# Patient Record
Sex: FEMALE | Race: WHITE
[De-identification: names, ages, dates, MRNs, and addresses within clinical notes are randomized per-mention and may not be internally consistent; named-entity substitution may affect disease eponyms.]

---

## 2020-08-24 ENCOUNTER — HOSPITAL ENCOUNTER (EMERGENCY)
Dept: HOSPITAL 75 - ER | Age: 40
LOS: 1 days | Discharge: LEFT BEFORE DISCHARGE | End: 2020-08-25
Payer: SELF-PAY

## 2020-08-24 VITALS — BODY MASS INDEX: 43.49 KG/M2 | WEIGHT: 261.03 LBS | HEIGHT: 65 IN

## 2020-08-24 DIAGNOSIS — E66.9: ICD-10-CM

## 2020-08-24 DIAGNOSIS — A41.9: Primary | ICD-10-CM

## 2020-08-24 DIAGNOSIS — F19.90: ICD-10-CM

## 2020-08-24 DIAGNOSIS — F17.210: ICD-10-CM

## 2020-08-24 DIAGNOSIS — L03.312: ICD-10-CM

## 2020-08-24 DIAGNOSIS — L03.114: ICD-10-CM

## 2020-08-24 LAB
%HYPO/RBC NFR BLD AUTO: (no result) %
ALBUMIN SERPL-MCNC: 3.8 GM/DL (ref 3.2–4.5)
ALP SERPL-CCNC: 122 U/L (ref 40–136)
ALT SERPL-CCNC: 26 U/L (ref 0–55)
ANISOCYTOSIS BLD QL SMEAR: SLIGHT
APTT BLD: 36 SEC (ref 24–35)
BASOPHILS # BLD AUTO: 0 10^3/UL (ref 0–0.1)
BASOPHILS NFR BLD AUTO: 0 % (ref 0–10)
BILIRUB SERPL-MCNC: 0.4 MG/DL (ref 0.1–1)
BUN/CREAT SERPL: 9
CALCIUM SERPL-MCNC: 10.2 MG/DL (ref 8.5–10.1)
CHLORIDE SERPL-SCNC: 97 MMOL/L (ref 98–107)
CO2 SERPL-SCNC: 24 MMOL/L (ref 21–32)
CREAT SERPL-MCNC: 0.82 MG/DL (ref 0.6–1.3)
EOSINOPHIL # BLD AUTO: 0.1 10^3/UL (ref 0–0.3)
EOSINOPHIL NFR BLD AUTO: 1 % (ref 0–10)
ERYTHROCYTE [DISTWIDTH] IN BLOOD BY AUTOMATED COUNT: 16.3 % (ref 10–14.5)
GFR SERPLBLD BASED ON 1.73 SQ M-ARVRAT: > 60 ML/MIN
GLUCOSE SERPL-MCNC: 109 MG/DL (ref 70–105)
HCT VFR BLD CALC: 35 % (ref 35–52)
HGB BLD-MCNC: 11.3 G/DL (ref 11.5–16)
INR PPP: 1.1 (ref 0.8–1.4)
LYMPHOCYTES # BLD AUTO: 2.1 X 10^3 (ref 1–4)
LYMPHOCYTES NFR BLD AUTO: 12 % (ref 12–44)
MANUAL DIFFERENTIAL PERFORMED BLD QL: YES
MCH RBC QN AUTO: 26 PG (ref 25–34)
MCHC RBC AUTO-ENTMCNC: 32 G/DL (ref 32–36)
MCV RBC AUTO: 80 FL (ref 80–99)
METAMYELOCYTES NFR BLD: 2 %
MONOCYTES # BLD AUTO: 2.9 X 10^3 (ref 0–1)
MONOCYTES NFR BLD AUTO: 16 % (ref 0–12)
MONOCYTES NFR BLD: 14 %
NEUTROPHILS # BLD AUTO: 12.8 X 10^3 (ref 1.8–7.8)
NEUTROPHILS NFR BLD AUTO: 71 % (ref 42–75)
NEUTS BAND NFR BLD MANUAL: 66 %
NEUTS BAND NFR BLD: 5 %
PLATELET # BLD: 493 10^3/UL (ref 130–400)
PMV BLD AUTO: 9.5 FL (ref 7.4–10.4)
POTASSIUM SERPL-SCNC: 3.9 MMOL/L (ref 3.6–5)
PROT SERPL-MCNC: 7.6 GM/DL (ref 6.4–8.2)
PROTHROMBIN TIME: 14.2 SEC (ref 12.2–14.7)
SODIUM SERPL-SCNC: 134 MMOL/L (ref 135–145)
VARIANT LYMPHS NFR BLD MANUAL: 13 %
WBC # BLD AUTO: 17.9 10^3/UL (ref 4.3–11)

## 2020-08-24 PROCEDURE — 71260 CT THORAX DX C+: CPT

## 2020-08-24 PROCEDURE — 80306 DRUG TEST PRSMV INSTRMNT: CPT

## 2020-08-24 PROCEDURE — 84145 PROCALCITONIN (PCT): CPT

## 2020-08-24 PROCEDURE — 83605 ASSAY OF LACTIC ACID: CPT

## 2020-08-24 PROCEDURE — 80320 DRUG SCREEN QUANTALCOHOLS: CPT

## 2020-08-24 PROCEDURE — 99284 EMERGENCY DEPT VISIT MOD MDM: CPT

## 2020-08-24 PROCEDURE — 93041 RHYTHM ECG TRACING: CPT

## 2020-08-24 PROCEDURE — 85610 PROTHROMBIN TIME: CPT

## 2020-08-24 PROCEDURE — 85730 THROMBOPLASTIN TIME PARTIAL: CPT

## 2020-08-24 PROCEDURE — 84703 CHORIONIC GONADOTROPIN ASSAY: CPT

## 2020-08-24 PROCEDURE — 80053 COMPREHEN METABOLIC PANEL: CPT

## 2020-08-24 PROCEDURE — 87040 BLOOD CULTURE FOR BACTERIA: CPT

## 2020-08-24 PROCEDURE — 85007 BL SMEAR W/DIFF WBC COUNT: CPT

## 2020-08-24 PROCEDURE — 85027 COMPLETE CBC AUTOMATED: CPT

## 2020-08-24 PROCEDURE — 81000 URINALYSIS NONAUTO W/SCOPE: CPT

## 2020-08-24 PROCEDURE — 36415 COLL VENOUS BLD VENIPUNCTURE: CPT

## 2020-08-25 ENCOUNTER — HOSPITAL ENCOUNTER (OUTPATIENT)
Dept: HOSPITAL 75 - ER | Age: 40
Setting detail: OBSERVATION
LOS: 1 days | Discharge: LEFT BEFORE DISCHARGE | End: 2020-08-26
Attending: INTERNAL MEDICINE | Admitting: INTERNAL MEDICINE
Payer: SELF-PAY

## 2020-08-25 VITALS — SYSTOLIC BLOOD PRESSURE: 105 MMHG | DIASTOLIC BLOOD PRESSURE: 59 MMHG

## 2020-08-25 VITALS — BODY MASS INDEX: 43.56 KG/M2 | HEIGHT: 64.96 IN | WEIGHT: 261.47 LBS

## 2020-08-25 VITALS — SYSTOLIC BLOOD PRESSURE: 133 MMHG | DIASTOLIC BLOOD PRESSURE: 78 MMHG

## 2020-08-25 DIAGNOSIS — F29: ICD-10-CM

## 2020-08-25 DIAGNOSIS — F17.210: ICD-10-CM

## 2020-08-25 DIAGNOSIS — A41.9: Primary | ICD-10-CM

## 2020-08-25 DIAGNOSIS — Z79.899: ICD-10-CM

## 2020-08-25 DIAGNOSIS — F41.9: ICD-10-CM

## 2020-08-25 DIAGNOSIS — L03.90: ICD-10-CM

## 2020-08-25 DIAGNOSIS — I10: ICD-10-CM

## 2020-08-25 DIAGNOSIS — E78.00: ICD-10-CM

## 2020-08-25 DIAGNOSIS — F32.9: ICD-10-CM

## 2020-08-25 LAB
%HYPO/RBC NFR BLD AUTO: SLIGHT %
ALBUMIN SERPL-MCNC: 3.4 GM/DL (ref 3.2–4.5)
ALP SERPL-CCNC: 114 U/L (ref 40–136)
ALT SERPL-CCNC: 27 U/L (ref 0–55)
ANISOCYTOSIS BLD QL SMEAR: SLIGHT
APTT PPP: YELLOW S
BACTERIA #/AREA URNS HPF: NEGATIVE /HPF
BARBITURATES UR QL: NEGATIVE
BASOPHILS # BLD AUTO: 0 10^3/UL (ref 0–0.1)
BASOPHILS NFR BLD AUTO: 0 % (ref 0–10)
BASOPHILS NFR BLD MANUAL: 0 %
BENZODIAZ UR QL SCN: NEGATIVE
BILIRUB SERPL-MCNC: 0.3 MG/DL (ref 0.1–1)
BILIRUB UR QL STRIP: NEGATIVE
BUN/CREAT SERPL: 6
CALCIUM SERPL-MCNC: 9.3 MG/DL (ref 8.5–10.1)
CHLORIDE SERPL-SCNC: 97 MMOL/L (ref 98–107)
CO2 SERPL-SCNC: 27 MMOL/L (ref 21–32)
COCAINE UR QL: NEGATIVE
CREAT SERPL-MCNC: 0.79 MG/DL (ref 0.6–1.3)
EOSINOPHIL # BLD AUTO: 0.3 10^3/UL (ref 0–0.3)
EOSINOPHIL NFR BLD AUTO: 1 % (ref 0–10)
EOSINOPHIL NFR BLD MANUAL: 0 %
ERYTHROCYTE [DISTWIDTH] IN BLOOD BY AUTOMATED COUNT: 15.9 % (ref 10–14.5)
FIBRINOGEN PPP-MCNC: (no result) MG/DL
GFR SERPLBLD BASED ON 1.73 SQ M-ARVRAT: > 60 ML/MIN
GLUCOSE SERPL-MCNC: 118 MG/DL (ref 70–105)
GLUCOSE UR STRIP-MCNC: NEGATIVE MG/DL
HCT VFR BLD CALC: 31 % (ref 35–52)
HGB BLD-MCNC: 10 G/DL (ref 11.5–16)
KETONES UR QL STRIP: NEGATIVE
LEUKOCYTE ESTERASE UR QL STRIP: NEGATIVE
LYMPHOCYTES # BLD AUTO: 2.3 X 10^3 (ref 1–4)
LYMPHOCYTES NFR BLD AUTO: 11 % (ref 12–44)
MANUAL DIFFERENTIAL PERFORMED BLD QL: YES
MCH RBC QN AUTO: 26 PG (ref 25–34)
MCHC RBC AUTO-ENTMCNC: 33 G/DL (ref 32–36)
MCV RBC AUTO: 80 FL (ref 80–99)
METHADONE UR QL SCN: NEGATIVE
METHAMPHETAMINE SCREEN URINE S: POSITIVE
MONOCYTES # BLD AUTO: 2.8 X 10^3 (ref 0–1)
MONOCYTES NFR BLD AUTO: 13 % (ref 0–12)
MONOCYTES NFR BLD: 12 %
NEUTROPHILS # BLD AUTO: 15.8 X 10^3 (ref 1.8–7.8)
NEUTROPHILS NFR BLD AUTO: 74 % (ref 42–75)
NEUTS BAND NFR BLD MANUAL: 61 %
NEUTS BAND NFR BLD: 11 %
NITRITE UR QL STRIP: NEGATIVE
OPIATES UR QL SCN: NEGATIVE
OXYCODONE UR QL: NEGATIVE
PH UR STRIP: 6 [PH] (ref 5–9)
PLATELET # BLD: 478 10^3/UL (ref 130–400)
PMV BLD AUTO: 9.6 FL (ref 7.4–10.4)
POLYCHROMASIA BLD QL SMEAR: SLIGHT
POTASSIUM SERPL-SCNC: 3.2 MMOL/L (ref 3.6–5)
PROPOXYPH UR QL: NEGATIVE
PROT SERPL-MCNC: 6.8 GM/DL (ref 6.4–8.2)
PROT UR QL STRIP: (no result)
RBC #/AREA URNS HPF: (no result) /HPF
ROULEAUX BLD QL SMEAR: SLIGHT
SODIUM SERPL-SCNC: 133 MMOL/L (ref 135–145)
SP GR UR STRIP: 1.01 (ref 1.02–1.02)
SQUAMOUS #/AREA URNS HPF: (no result) /HPF
TARGETS BLD QL SMEAR: SLIGHT
TRICYCLICS UR QL SCN: NEGATIVE
VARIANT LYMPHS NFR BLD MANUAL: 16 %
WBC # BLD AUTO: 21.2 10^3/UL (ref 4.3–11)
WBC #/AREA URNS HPF: (no result) /HPF

## 2020-08-25 PROCEDURE — 83605 ASSAY OF LACTIC ACID: CPT

## 2020-08-25 PROCEDURE — 85007 BL SMEAR W/DIFF WBC COUNT: CPT

## 2020-08-25 PROCEDURE — 36415 COLL VENOUS BLD VENIPUNCTURE: CPT

## 2020-08-25 PROCEDURE — 85027 COMPLETE CBC AUTOMATED: CPT

## 2020-08-25 PROCEDURE — 80053 COMPREHEN METABOLIC PANEL: CPT

## 2020-08-25 PROCEDURE — 87040 BLOOD CULTURE FOR BACTERIA: CPT

## 2020-08-25 PROCEDURE — 99284 EMERGENCY DEPT VISIT MOD MDM: CPT

## 2020-08-25 PROCEDURE — 85025 COMPLETE CBC W/AUTO DIFF WBC: CPT

## 2020-08-25 RX ADMIN — SODIUM CHLORIDE SCH MLS/HR: 900 INJECTION, SOLUTION INTRAVENOUS at 01:24

## 2020-08-25 RX ADMIN — SODIUM CHLORIDE SCH MLS/HR: 900 INJECTION, SOLUTION INTRAVENOUS at 22:17

## 2020-08-25 RX ADMIN — SODIUM CHLORIDE ONE MLS/HR: 900 INJECTION, SOLUTION INTRAVENOUS at 23:37

## 2020-08-25 RX ADMIN — VANCOMYCIN HYDROCHLORIDE SCH MLS/HR: 500 INJECTION, POWDER, LYOPHILIZED, FOR SOLUTION INTRAVENOUS at 23:37

## 2020-08-25 RX ADMIN — SODIUM CHLORIDE ONE MLS/HR: 900 INJECTION, SOLUTION INTRAVENOUS at 23:30

## 2020-08-25 RX ADMIN — SODIUM CHLORIDE SCH MLS/HR: 900 INJECTION, SOLUTION INTRAVENOUS at 00:17

## 2020-08-25 RX ADMIN — SODIUM CHLORIDE ONE MLS/HR: 900 INJECTION, SOLUTION INTRAVENOUS at 23:38

## 2020-08-25 RX ADMIN — SODIUM CHLORIDE SCH MLS/HR: 900 INJECTION, SOLUTION INTRAVENOUS at 20:55

## 2020-08-25 RX ADMIN — SODIUM CHLORIDE ONE MLS/HR: 900 INJECTION, SOLUTION INTRAVENOUS at 23:31

## 2020-08-25 NOTE — DIAGNOSTIC IMAGING REPORT
PROCEDURE: CT chest with contrast only.



TECHNIQUE: Multiple contiguous axial images were obtained through

the chest after administration of intravenous contrast. Auto

Exposure Controls were utilized during the CT exam to meet ALARA

standards for radiation dose reduction. 



INDICATION:  Abscess left upper back.



CORRELATION: None



FINDINGS: 

A few scattered nonpathologically enlarged mediastinal lymph

nodes are present. There is prominent bilateral axillary lymph

nodes left greater than right. Given changes of the left back, on

the left likely reactive.  Heart size normal with trace anterior

pericardial effusion. 



The lung fields are clear of infiltrate.   No significant pleural

effusion.  



Mild hepatic steatosis. Nodularity and prominence left adrenal

gland, nonspecific.



There is rather extensive inflammatory density incompletely

visualized over the subcutaneous left periscapular subcutaneous

soft tissues. A definitive encapsulated drainable abscess does

not appear to be suggested. No abnormal gas collection.





IMPRESSION: 

Rather extensive subcutaneous inflammatory changes about the left

periscapular subcutaneous soft tissues. A definitive encapsulated

drainable abscess is not demonstrated. Likely reactive left

axillary lymphadenopathy.





A preliminary report was provided by [facility].







Dictated by: 



  Dictated on workstation # [id number]

## 2020-08-25 NOTE — ED INTEGUMENTARY GENERAL
General


Chief Complaint:  Skin/Wound Problems


Stated Complaint:  SPIDER BITE/FEVER 102.9


Nursing Triage Note:  


Pt ambulates to room #5 with c/o wound et fever. Pt reports approx x4days ago, 


while working in her yard, she felt a sharp "bite" to her L lateral shoulder. Pt




reports increase swelling, redness, warmth, et pain to wound bed. Initial pulse 


rate 143 with oral temp. 39.4. Pt noted to be tearful during triage. A&OX4.


Source:  patient





History of Present Illness


Date Seen by Provider:  Aug 24, 2020


Time Seen by Provider:  22:47


Initial Comments


PT ARRIVES VIA POV FROM HOME


C/O INFECTION IN LEFT SHOULDER BLADE AREA--NOTICED "SHARP STABBING PAIN" 4 DAYS 

AGO WHILE SHE WAS WORKING IN HER YARD


C/O INCREASED PAIN--RATES "11"/10


C/O INCREASED REDNESS AND SWELLING


C/O FEVER FOR THE LAST 2 DAYS--TEMP .9 YESTERDAY--HAD TYLENOL 6 HOURS 

AGO. 


NO DRAINAGE TO THE AREA





NO HISTORY OF SIMILAR, BUT STATES SHE "ALWAYS GETS PIMPLES" AROUND HER 

MIDSECTION





HAS NEW, LARGE TATTOO IN THIS AREA 2 WEEKS AGO





SYMPTOMS NO DIFFERENT TONIGHT


HAS NOT SOUGHT CARE UNTIL TONIGHT





PCP: NONE





Allergies and Home Medications


Allergies


Coded Allergies:  


     No Known Drug Allergies (Verified , 2/19/09)





Patient Home Medication List


Home Medication List Reviewed:  Yes





Review of Systems


Review of Systems


Constitutional:  see HPI, chills, fever


Respiratory:  no symptoms reported; No cough, No short of breath


Cardiovascular:  no symptoms reported


Gastrointestinal:  no symptoms reported


Genitourinary:  no symptoms reported


Musculoskeletal:  see HPI


Skin:  see HPI


Psychiatric/Neurological:  No Symptoms Reported


Endocrine:  No Symptoms Reported


Hematologic/Lymphatic:  No Symptoms Reported





Past Medical-Social-Family Hx


Patient Social History


Alcohol Use:  Past History (HISTORY OF ABUSE/HEAVY USE--CLAIMS NONE X 12 YEARS)


Recreational Drug Use:  Yes (SMOKES METHAMPHETAMINE AND THC)


Drug of Choice:  HX Methamphetmaine


Smoking Status:  Current Everyday Smoker (1 1/2 PPD)


Type Used:  Cigarettes


2nd Hand Smoke Exposure:  No


Recent Foreign Travel:  No


Contact w/Someone Who Travel:  No


Recent Infectious Disease Expo:  No


Recent Hopitalizations:  No





Seasonal Allergies


Seasonal Allergies:  No





Past Medical History


Surgeries:  No


Respiratory:  Yes (NO INHALERS OR NEBULIZERS OR O2)


COPD


Cardiac:  Yes (SUPPOSED TO BE ON MEDICATIONS BUT DOES NOT TAKE)


High Cholesterol, Hypertension


Neurological:  No


Reproductive Disorders:  No


Sexually Transmitted Disease:  No


Genitourinary:  No


Gastrointestinal:  No


Musculoskeletal:  No


Endocrine:  Yes (OBESITY)


HEENT:  No


Cancer:  No


Psychosocial:  Yes (SUPPOSED TO BE ON MEDS, BUT DOES NOT TAKE)


Anxiety, Depression


Integumentary:  Yes


Recent Skin Changes


Blood Disorders:  No





Family Medical History





SOCIAL HISTORY: 


-HISTORY OF ETOH ABUSE/HEAVY USE--CLAIMS NONE X 12 YEARS, PER PT ON


08/24/20


-SMOKES METHAMPHETAMINES AND THC--DENIES IV USE


-SMOKES CIGARETTES 1 1/2 PPD





Physical Exam


Vital Signs





Vital Signs - First Documented








 8/24/20





 22:48


 


Temp 39.4


 


Pulse 143


 


Resp 25


 


B/P (MAP) 153/88 (109)


 


Pulse Ox 98


 


O2 Delivery Room Air





Capillary Refill : Less Than 3 Seconds


General Appearance:  obese, other (VERY DRAMATIC--"SOBBING" BUT NO TEARS. )


Cardiovascular:  no edema, no JVD, no murmur, tachycardia (140'S)


Respiratory:  normal breath sounds, no respiratory distress, no accessory muscle

use


Gastrointestinal:  non tender, soft


Back:  other (LEFT SCAPULAR AREA WITH RECENT, LARGE TATTOO; PT HAS A VERY LARGE 

AREA OF CELLULITIS--28 X 13 CM AREA OF ERYTHEMA, SWELLING, INDURATION AND MARKED

TENDERNESS. NO FLUCTUANCE. NO DRAINAGE. NO POINTING. NO OBVIOUS WOUND, ETC. NO 

STREAKS. )


Neurologic/Psychiatric:  CNs II-XII nml as tested, no motor/sensory deficits, 

alert, oriented x 3


Skin:  normal color, warm/dry, other (AS ABOVE)





Progress/Results/Core Measures


Results/Orders


Lab Results





Laboratory Tests








Test


 8/24/20


22:50 8/25/20


00:40 Range/Units


 


 


White Blood Count


 17.9 H


 


 4.3-11.0


10^3/uL


 


Red Blood Count


 4.37 


 


 4.35-5.85


10^6/uL


 


Hemoglobin 11.3 L  11.5-16.0  G/DL


 


Hematocrit 35   35-52  %


 


Mean Corpuscular Volume 80   80-99  FL


 


Mean Corpuscular Hemoglobin 26   25-34  PG


 


Mean Corpuscular Hemoglobin


Concent 32 


 


 32-36  G/DL





 


Red Cell Distribution Width 16.3 H  10.0-14.5  %


 


Platelet Count


 493 H


 


 130-400


10^3/uL


 


Mean Platelet Volume 9.5   7.4-10.4  FL


 


Neutrophils (%) (Auto) 71   42-75  %


 


Lymphocytes (%) (Auto) 12   12-44  %


 


Monocytes (%) (Auto) 16 H  0-12  %


 


Eosinophils (%) (Auto) 1   0-10  %


 


Basophils (%) (Auto) 0   0-10  %


 


Neutrophils # (Auto) 12.8 H  1.8-7.8  X 10^3


 


Lymphocytes # (Auto) 2.1   1.0-4.0  X 10^3


 


Monocytes # (Auto) 2.9 H  0.0-1.0  X 10^3


 


Eosinophils # (Auto)


 0.1 


 


 0.0-0.3


10^3/uL


 


Basophils # (Auto)


 0.0 


 


 0.0-0.1


10^3/uL


 


Neutrophils % (Manual) 66    %


 


Lymphocytes % (Manual) 13    %


 


Monocytes % (Manual) 14    %


 


Metamyelocytes % 2    %


 


Band Neutrophils 5    %


 


Hypochromasia MODERATE    


 


Anisocytosis SLIGHT    


 


Prothrombin Time 14.2   12.2-14.7  SEC


 


INR Comment 1.1   0.8-1.4  


 


Activated Partial


Thromboplast Time 36 H


 


 24-35  SEC





 


Sodium Level 134 L  135-145  MMOL/L


 


Potassium Level 3.9   3.6-5.0  MMOL/L


 


Chloride Level 97 L    MMOL/L


 


Carbon Dioxide Level 24   21-32  MMOL/L


 


Anion Gap 13   5-14  MMOL/L


 


Blood Urea Nitrogen 7   7-18  MG/DL


 


Creatinine


 0.82 


 


 0.60-1.30


MG/DL


 


Estimat Glomerular Filtration


Rate > 60 


 


  





 


BUN/Creatinine Ratio 9    


 


Glucose Level 109 H    MG/DL


 


Lactic Acid Level


 2.26 *H


 


 0.50-2.00


MMOL/L


 


Calcium Level 10.2 H  8.5-10.1  MG/DL


 


Corrected Calcium 10.4 H  8.5-10.1  MG/DL


 


Total Bilirubin 0.4   0.1-1.0  MG/DL


 


Aspartate Amino Transf


(AST/SGOT) 28 


 


 5-34  U/L





 


Alanine Aminotransferase


(ALT/SGPT) 26 


 


 0-55  U/L





 


Alkaline Phosphatase 122     U/L


 


Total Protein 7.6   6.4-8.2  GM/DL


 


Albumin 3.8   3.2-4.5  GM/DL


 


Procalcitonin 0.64 H  <0.10  NG/ML


 


Serum Pregnancy Test,


Qualitative NEGATIVE 


 


 NEGATIVE  





 


Serum Alcohol < 10   <10  MG/DL


 


Urine Color  YELLOW   


 


Urine Clarity  SL CLOUDY   


 


Urine pH  6.0  5-9  


 


Urine Specific Gravity  1.010 L 1.016-1.022  


 


Urine Protein  1+ H NEGATIVE  


 


Urine Glucose (UA)  NEGATIVE  NEGATIVE  


 


Urine Ketones  NEGATIVE  NEGATIVE  


 


Urine Nitrite  NEGATIVE  NEGATIVE  


 


Urine Bilirubin  NEGATIVE  NEGATIVE  


 


Urine Urobilinogen  1.0  < = 1.0  MG/DL


 


Urine Leukocyte Esterase  NEGATIVE  NEGATIVE  


 


Urine RBC (Auto)  TRACE-I  NEGATIVE  


 


Urine RBC  NONE   /HPF


 


Urine WBC  NONE   /HPF


 


Urine Squamous Epithelial


Cells 


 25-50 H


  /HPF





 


Urine Crystals  NONE   /LPF


 


Urine Bacteria  NEGATIVE   /HPF


 


Urine Casts  NONE   /LPF


 


Urine Mucus  NEGATIVE   /LPF


 


Urine Culture Indicated  NO   


 


Urine Opiates Screen  NEGATIVE  NEGATIVE  


 


Urine Oxycodone Screen  NEGATIVE  NEGATIVE  


 


Urine Methadone Screen  NEGATIVE  NEGATIVE  


 


Urine Propoxyphene Screen  NEGATIVE  NEGATIVE  


 


Urine Barbiturates Screen  NEGATIVE  NEGATIVE  


 


Ur Tricyclic Antidepressants


Screen 


 NEGATIVE 


 NEGATIVE  





 


Urine Phencyclidine Screen  NEGATIVE  NEGATIVE  


 


Urine Amphetamines Screen  POSITIVE H NEGATIVE  


 


Urine Methamphetamines Screen  POSITIVE H NEGATIVE  


 


Urine Benzodiazepines Screen  NEGATIVE  NEGATIVE  


 


Urine Cocaine Screen  NEGATIVE  NEGATIVE  


 


Urine Cannabinoids Screen  NEGATIVE  NEGATIVE  








My Orders





Orders - PATEL COVARRUBIAS DO


Ed Iv/Invasive Line Start (8/24/20 22:53)


Monitor-Rhythm Ecg Trace Only (8/24/20 22:53)


Alcohol (8/24/20 22:53)


Cbc With Automated Diff (8/24/20 22:53)


Comprehensive Metabolic Panel (8/24/20 22:53)


Drug Screen Stat (Urine) (8/24/20 22:53)


Hcg,Qualitative Serum (8/24/20 22:53)


Lactic Acid Analyzer (8/24/20 22:53)


Protime With Inr (8/24/20 22:53)


Partial Thromboplastin Time (8/24/20 22:53)


Ua Culture If Indicated (8/24/20 22:53)


Blood Culture (8/24/20 22:53)


Ed Iv/Invasive Line Start (8/24/20 22:53)


Lactated Ringers (Lr 1000 Ml Iv Solution (8/24/20 22:53)


Ketorolac Injection (Toradol Injection) (8/24/20 22:53)


Ct Chest W (8/24/20 22:53)


Piperacillin Sodium/Tazobactam (Zosyn Vi (8/24/20 23:15)


Vancomycin Injection (Vancomycin Injecti (8/24/20 23:15)


Procalcitonin (Pct) (8/24/20 22:50)


Manual Differential (8/24/20 22:50)


Acetaminophen  Tablet (Tylenol  Tablet) (8/25/20 00:00)


Ibuprofen  Tablet (Motrin  Tablet) (8/25/20 00:00)


Dipht,Pertuss(Acell),Tet Adult (Boostrix (8/25/20 00:45)





Medications Given in ED





Current Medications








 Medications  Dose


 Ordered  Sig/Lucila


 Route  Start Time


 Stop Time Status Last Admin


Dose Admin


 


 Acetaminophen  1,000 mg  ONCE  ONCE


 PO  8/25/20 00:00


 8/25/20 00:01 DC 8/25/20 00:17


1,000 MG


 


 Ibuprofen  800 mg  ONCE  ONCE


 PO  8/25/20 00:00


 8/25/20 00:01 DC 8/25/20 00:17


800 MG


 


 Lactated Ringer's  1,000 ml @ 


 0 mls/hr  Q0M ONCE


 IV  8/24/20 22:53


 8/24/20 22:58 DC 8/24/20 23:11


0 MLS/HR


 


 Piperacillin Sod/


 Tazobactam Sod


 4.5 gm/Sodium


 Chloride  100 ml @ 


 200 mls/hr  ONCE  ONCE


 IV  8/24/20 23:15


 8/24/20 23:44 DC 8/24/20 23:16


200 MLS/HR








Vital Signs/I&O











 8/24/20 8/25/20





 22:48 01:35


 


Temp 39.4 36.9


 


Pulse 143 99


 


Resp 25 18


 


B/P (MAP) 153/88 (109) 105/59


 


Pulse Ox 98 97


 


O2 Delivery Room Air Room Air














Blood Pressure Mean:                    109











Progress


Progress Note :  


Progress Note


PT STATES ON ARRIVAL SHE IS NOT STAYING IN THE HOSPITAL--STATES SHE IS "CAREGIVE

R FOR HER MOT[place]" AND THEREFORE CANNOT STAY


MYSELF AND RN BOTH ADAMANTLY ENCOURAGED THE PATIENT MULTIPLE TIMES, TO STAY AND 

BE ADMITTED TO THE HOSPITAL, AND SHE ADAMANTLY REFUSES


PT ADVISED OF RISKS OF WORSENING OF INFECTION/SHOCK/DEATH AND OF BENEFITS OF 

STAYING


ADVISED HER TO FOLLOW UP WITH  OF CHOICE IN THE MORNING FOR FURTHER CARE





PT FEELING BETTER, HEART RATE IN 90'S, BP >100, TEMP DOWN PRIOR TO PT SIGNING 

OUT AMA





Diagnostic Imaging





Comments


CT CHEST INCLUDING SOFT TISSUES--MODERATE SUB Q INFLAMMATION AND NON-

ENCAPSULATED SUB Q FLUID IN LEFT PERISCAPULAR SUB Q SOFT TISSUES . NO EVIDENCE 

OF ENCAPSULATED ABSCESS, MILD SHODDY LEFT AXILLARY ADENOPATHY. PER [facility] VIA 

FAX AT 0018


   Reviewed:  Reviewed by Me





Departure


Impression





   Primary Impression:  


   Sepsis


   Additional Impressions:  


   CELLULITIS OF BACK AND LEFT SCAPULAR AREA


   Left against medical advice


   Illicit drug use


Disposition:  07 AGAINST MEDICAL ADVICE


Condition:  Against Medical Advice





Departure-Patient Inst.


Referrals:  


NO,LOCAL PHYSICIAN (PCP/Family)


Primary Care Physician





Images


Torso/Trunk











1 - Cellulitis, Swelling, Tenderness














PATEL COVARRUBIAS DO                 Aug 25, 2020 00:34

## 2020-08-25 NOTE — NUR
JER RODRÍGUEZ admitted to room 423-1, with an admitting diagnosis of CELLULITIS, on 
08/25/20 from ED via WHEELCHAIR, accompanied by ED STAFF. JER RODRÍGUEZ introduced to 
surroundings, call light, bed controls, phone, TV, temperature control, lights, meal times, 
smoking policy, visitor policy, side rail policy, bathrooms and showers.  Patient Rights 
given to patient in the handbook. JER RODRÍGUEZ verbalizes understanding that [facility] is not responsible for the loss or damage to any personal effects or valuables that 
are kept in the patients posession during their hospitalization. JER RODRÍGUEZ 
verbalizes understanding of Interdisciplinary Patient Education. Patient and/or family were 
informed about the Rapid Response Team and its purpose.

## 2020-08-25 NOTE — NUR
JER RODRÍGUEZ admitted to room 423-1, with an admitting diagnosis of cellulitis of 
left upper arm/shoulder, on 08/25/20 from ER via wheelchair, accompanied by 
staff.JER RODRÍGUEZ introduced to surroundings, call light, bed controls, phone, TV, 
temperature control, lights, meal times, smoking policy, visitor policy, side rail policy, 
bathrooms and showers.  Patient Rights given to patient in the handbook. JER RODRÍGUEZ 
verbalizes understanding that [facility] is not responsible for the loss or damage to any 
personal effects or valuables that are kept in the patients posession during their 
hospitalization.

## 2020-08-25 NOTE — ED INTEGUMENTARY GENERAL
General


Chief Complaint:  Skin/Wound Problems


Stated Complaint:  FEVER / SORE ON BACK


Nursing Triage Note:  


left shoulder redness/warmth/pain. reports insect bite to shoulder 4-5 days ago.


Source:  patient


Exam Limitations:  no limitations





History of Present Illness


Date Seen by Provider:  Aug 25, 2020


Time Seen by Provider:  19:25


Initial Comments


39-year-old female who presents to emergency room with complaints of increased 

pain, fever, worsening cellulitis to the left shoulder. She reports that she 

received an insect bite to the left shoulder 4-5 days ago. She was seen and 

evaluated and found to have sepsis from the cellulitis last night but left 

AGAINST MEDICAL ADVICE because she did not want to be admitted to the hospital. 

She agrees to be admitted to the hospital at this time.


Associated Symptoms:  change in skin texture, fever





Allergies and Home Medications


Allergies


Coded Allergies:  


     No Known Drug Allergies (Verified , 2/19/09)





Home Medications


No Active Prescriptions or Reported Meds





Patient Home Medication List


Home Medication List Reviewed:  Yes





Review of Systems


Review of Systems


Constitutional:  see HPI; No chills; fever


Skin:  see HPI, change in color, other (cellulitis to left shoulder)





All Other Systems Reviewed


Negative Unless Noted:  Yes





Past Medical-Social-Family Hx


Past Med/Social Hx:  Reviewed Nursing Past Med/Soc Hx


Patient Social History


Alcohol Use:  Denies Use


Recreational Drug Use:  No


Drug of Choice:  denies


Smoking Status:  Current Everyday Smoker


Type Used:  Cigarettes


2nd Hand Smoke Exposure:  No


Recent Foreign Travel:  No


Contact w/Someone Who Travel:  No


Recent Infectious Disease Expo:  No


Recent Hopitalizations:  No


Physical Abuse:  No


Sexual Abuse:  No


Mistreated:  No


Fear:  No





Immunizations Up To Date


Tetanus Booster (TDap):  Unknown





Seasonal Allergies


Seasonal Allergies:  No





Past Medical History


Surgeries:  No


Respiratory:  Yes


COPD


Cardiac:  Yes


High Cholesterol, Hypertension


Neurological:  No


Pregnant:  No


Reproductive Disorders:  No


Sexually Transmitted Disease:  No


Genitourinary:  No


Gastrointestinal:  No


Musculoskeletal:  No


Endocrine:  No


HEENT:  No


Cancer:  No


Psychosocial:  Yes


Anxiety, Depression


Integumentary:  Yes


Recent Skin Changes


Blood Disorders:  No





Family Medical History


Reviewed Nursing Family Hx





SOCIAL HISTORY: 


-HISTORY OF ETOH ABUSE/HEAVY USE--CLAIMS NONE X 12 YEARS, PER PT ON


08/24/20


-SMOKES METHAMPHETAMINES AND THC--DENIES IV USE


-SMOKES CIGARETTES 1 1/2 PPD





Physical Exam


Vital Signs





Vital Signs - First Documented








 8/25/20





 19:18


 


Temp 38.1


 


Pulse 120


 


Resp 18


 


B/P (MAP) 148/84 (105)


 


Pulse Ox 98


 


O2 Delivery Room Air





Capillary Refill : Less Than 3 Seconds


General Appearance:  WD/WN, no apparent distress


Back:  other (LEFT SCAPULAR AREA WITH RECENT, LARGE TATTOO; PT HAS A VERY LARGE 

AREA OF CELLULITIS--28 X 13 CM AREA OF ERYTHEMA, SWELLING, INDURATION AND MARKED

TENDERNESS. NO FLUCTUANCE. NO DRAINAGE. AREA GO GREY COLOR IN THE CENTER OF THE 

MARKED WOUND FROM YESTERDAY. WOUND HAS NOT SPREAD OUT OT THE WOUND MARKING.)


Neurologic/Psychiatric:  alert, normal mood/affect, oriented x 3


Skin:  normal color, warm/dry





Progress/Results/Core Measures


Results/Orders


Lab Results





Laboratory Tests








Test


 8/25/20


19:30 Range/Units


 


 


White Blood Count


 21.2 H


 4.3-11.0


10^3/uL


 


Red Blood Count


 3.83 L


 4.35-5.85


10^6/uL


 


Hemoglobin 10.0 L 11.5-16.0  G/DL


 


Hematocrit 31 L 35-52  %


 


Mean Corpuscular Volume 80  80-99  FL


 


Mean Corpuscular Hemoglobin 26  25-34  PG


 


Mean Corpuscular Hemoglobin


Concent 33 


 32-36  G/DL





 


Red Cell Distribution Width 15.9 H 10.0-14.5  %


 


Platelet Count


 478 H


 130-400


10^3/uL


 


Mean Platelet Volume 9.6  7.4-10.4  FL


 


Neutrophils (%) (Auto) 74  42-75  %


 


Lymphocytes (%) (Auto) 11 L 12-44  %


 


Monocytes (%) (Auto) 13 H 0-12  %


 


Eosinophils (%) (Auto) 1  0-10  %


 


Basophils (%) (Auto) 0  0-10  %


 


Neutrophils # (Auto) 15.8 H 1.8-7.8  X 10^3


 


Lymphocytes # (Auto) 2.3  1.0-4.0  X 10^3


 


Monocytes # (Auto) 2.8 H 0.0-1.0  X 10^3


 


Eosinophils # (Auto)


 0.3 


 0.0-0.3


10^3/uL


 


Basophils # (Auto)


 0.0 


 0.0-0.1


10^3/uL


 


Neutrophils % (Manual) 61   %


 


Lymphocytes % (Manual) 16   %


 


Monocytes % (Manual) 12   %


 


Eosinophils % (Manual) 0   %


 


Basophils % (Manual) 0   %


 


Band Neutrophils 11   %


 


Polychromasia SLIGHT   


 


Hypochromasia SLIGHT   


 


Anisocytosis SLIGHT   


 


Target Cells SLIGHT   


 


[place] SLIGHT   


 


Sodium Level 133 L 135-145  MMOL/L


 


Potassium Level 3.2 L 3.6-5.0  MMOL/L


 


Chloride Level 97 L   MMOL/L


 


Carbon Dioxide Level 27  21-32  MMOL/L


 


Anion Gap 9  5-14  MMOL/L


 


Blood Urea Nitrogen 5 L 7-18  MG/DL


 


Creatinine


 0.79 


 0.60-1.30


MG/DL


 


Estimat Glomerular Filtration


Rate > 60 


  





 


BUN/Creatinine Ratio 6   


 


Glucose Level 118 H   MG/DL


 


Lactic Acid Level


 1.57 


 0.50-2.00


MMOL/L


 


Calcium Level 9.3  8.5-10.1  MG/DL


 


Corrected Calcium 9.8  8.5-10.1  MG/DL


 


Total Bilirubin 0.3  0.1-1.0  MG/DL


 


Aspartate Amino Transf


(AST/SGOT) 24 


 5-34  U/L





 


Alanine Aminotransferase


(ALT/SGPT) 27 


 0-55  U/L





 


Alkaline Phosphatase 114    U/L


 


Total Protein 6.8  6.4-8.2  GM/DL


 


Albumin 3.4  3.2-4.5  GM/DL








My Orders





Orders - RICK MONTOYA


Comprehensive Metabolic Panel (8/25/20 19:24)


Ed Iv/Invasive Line Start (8/25/20 19:24)


Cbc With Automated Diff (8/25/20 19:24)


Blood Culture (8/25/20 19:24)


Lactic Acid Analyzer (8/25/20 19:24)


Manual Differential (8/25/20 19:30)


Ns Iv 1000 Ml (Sodium Chloride 0.9%) (8/25/20 20:45)


Acetaminophen  Tablet (Tylenol  Tablet) (8/25/20 20:45)


Piperacillin/Tazobactam (Bulk) (Zosyn In (8/25/20 20:45)


Piperacillin Sodium/Tazobactam (Zosyn Vi (8/25/20 20:44)


Ns (Ivpb) (Sodium Chloride 0.9% Ivpb Bag (8/25/20 20:44)





Medications Given in ED





Current Medications








 Medications  Dose


 Ordered  Sig/Lucila


 Route  Start Time


 Stop Time Status Last Admin


Dose Admin


 


 Acetaminophen  1,000 mg  ONCE  ONCE


 PO  8/25/20 20:45


 8/25/20 20:46 DC 8/25/20 20:56


1,000 MG


 


 Piperacillin Sod/


 Tazobactam Sod


 4.5 gm/Sodium


 Chloride  120 ml @ 


 240 mls/hr  ONCE  ONCE


 IV  8/25/20 20:45


 8/25/20 21:14 DC 8/25/20 20:55


240 MLS/HR








Vital Signs/I&O











 8/25/20 8/25/20





 19:18 20:56


 


Temp 38.1 38.1


 


Pulse 120 


 


Resp 18 


 


B/P (MAP) 148/84 (105) 


 


Pulse Ox 98 


 


O2 Delivery Room Air 














Blood Pressure Mean:                    105











Progress


Progress Note :  


   Time:  20:40


Progress Note


I have seen and evaluated the patient. I've informed her of her laboratory 

findings. I did discuss the case with Dr. Orellana who is on-call for 

hospitalist services. She agrees to accept the patient to her services. 

Continuing vancomycin and Zosyn that was initiated last night.





Departure


Communication (Admissions)


Time/Spoke to Admitting Phy:  20:40


Dr. Orellana.





Impression





   Primary Impression:  


   Cellulitis


Disposition:  09 ADMITTED AS INPATIENT


Condition:  Stable/Unchanged





Admissions


Decision to Admit Reason:  Admit from ER (General)


Decision to Admit/Date:  Aug 25, 2020


Time/Decision to Admit Time:  20:40





Departure-Patient Inst.


Referrals:  


NO,LOCAL PHYSICIAN (PCP/Family)


Primary Care Physician


Scripts


No Active Prescriptions or Reported Meds











RICK MONTOYA                  Aug 25, 2020 21:45

## 2020-08-26 VITALS — SYSTOLIC BLOOD PRESSURE: 110 MMHG | DIASTOLIC BLOOD PRESSURE: 60 MMHG

## 2020-08-26 VITALS — DIASTOLIC BLOOD PRESSURE: 71 MMHG | SYSTOLIC BLOOD PRESSURE: 128 MMHG

## 2020-08-26 VITALS — DIASTOLIC BLOOD PRESSURE: 67 MMHG | SYSTOLIC BLOOD PRESSURE: 126 MMHG

## 2020-08-26 LAB
ALBUMIN SERPL-MCNC: 3 GM/DL (ref 3.2–4.5)
ALP SERPL-CCNC: 120 U/L (ref 40–136)
ALT SERPL-CCNC: 25 U/L (ref 0–55)
BASOPHILS # BLD AUTO: 0 10^3/UL (ref 0–0.1)
BASOPHILS NFR BLD AUTO: 0 % (ref 0–10)
BILIRUB SERPL-MCNC: 0.4 MG/DL (ref 0.1–1)
BUN/CREAT SERPL: 7
CALCIUM SERPL-MCNC: 8.5 MG/DL (ref 8.5–10.1)
CHLORIDE SERPL-SCNC: 102 MMOL/L (ref 98–107)
CO2 SERPL-SCNC: 23 MMOL/L (ref 21–32)
CREAT SERPL-MCNC: 0.67 MG/DL (ref 0.6–1.3)
EOSINOPHIL # BLD AUTO: 0.2 10^3/UL (ref 0–0.3)
EOSINOPHIL NFR BLD AUTO: 1 % (ref 0–10)
ERYTHROCYTE [DISTWIDTH] IN BLOOD BY AUTOMATED COUNT: 16.1 % (ref 10–14.5)
GFR SERPLBLD BASED ON 1.73 SQ M-ARVRAT: > 60 ML/MIN
GLUCOSE SERPL-MCNC: 105 MG/DL (ref 70–105)
HCT VFR BLD CALC: 29 % (ref 35–52)
HGB BLD-MCNC: 9.3 G/DL (ref 11.5–16)
LYMPHOCYTES # BLD AUTO: 2.1 X 10^3 (ref 1–4)
LYMPHOCYTES NFR BLD AUTO: 11 % (ref 12–44)
MANUAL DIFFERENTIAL PERFORMED BLD QL: NO
MCH RBC QN AUTO: 26 PG (ref 25–34)
MCHC RBC AUTO-ENTMCNC: 33 G/DL (ref 32–36)
MCV RBC AUTO: 80 FL (ref 80–99)
MONOCYTES # BLD AUTO: 2.6 X 10^3 (ref 0–1)
MONOCYTES NFR BLD AUTO: 14 % (ref 0–12)
NEUTROPHILS # BLD AUTO: 14.1 X 10^3 (ref 1.8–7.8)
NEUTROPHILS NFR BLD AUTO: 74 % (ref 42–75)
PLATELET # BLD: 424 10^3/UL (ref 130–400)
PMV BLD AUTO: 9.9 FL (ref 7.4–10.4)
POTASSIUM SERPL-SCNC: 3.2 MMOL/L (ref 3.6–5)
PROT SERPL-MCNC: 6.2 GM/DL (ref 6.4–8.2)
SODIUM SERPL-SCNC: 137 MMOL/L (ref 135–145)
WBC # BLD AUTO: 19 10^3/UL (ref 4.3–11)

## 2020-08-26 RX ADMIN — VANCOMYCIN HYDROCHLORIDE SCH MLS/HR: 500 INJECTION, POWDER, LYOPHILIZED, FOR SOLUTION INTRAVENOUS at 02:10

## 2020-08-26 NOTE — DISCHARGE INST-SIMPLE/STANDARD
Discharge Inst-Standard


Discharge Medications


New, Converted or Re-Newed RX:  Transmitted to Pharmacy





Patient Instructions/Follow Up


Plan of Care/Instructions/FU:  


Please continue to take your medications as written. Please follow up with


a primary care doctor to follow up this hospital stay.


Activity as Tolerated:  Yes


Discharge Diet:  No Restrictions


Return to The Hospital For:  


Fever, worsening redness, swelling, drainage, shortness of breath, chest


pain, if you feel you are getting worse.





Planned Outpatient Orders/Ref.


Pneu Vac Indicated:  Yes











TAMMY BOBO MD              Aug 26, 2020 10:19

## 2020-08-26 NOTE — NUR
RD ASSESSMENT 



PMHx: COPD; hypercholesterolemia; HTN



PT INTERACTION: Pt was awake and semi-pleasant during nutrition assessment. Pt states 
current appetite is good, "but the food your serve here is terrible and tastes bad." Note PO 
intake of 50% x1meal, per chart review. Pt states following a regular diet at home, and has 
no issues with chewing/swallowing food, despite missing teeth (per pt). Pt states no recent 
issues wtih nausea, vomiting, constipation, or diarrhea, and that her last BM was prior to 
admit. Note pt not currently on bowel regimen per chart review. Pt states some recent wt 
loss, but unsure of amount lost. Pt attributes wt loss to recent passing of . Note 
unable to determine recent wt hx, per chart review. Note presence of wound (shoulder), per 
chart review. 



ABNORMAL NUTRITION-RELATED LAB VALUES

LOW: K 3.2; BUN 5; Pro 5.2; alb 3.0 

HIGH:



Est. kcal needs: 1775 kcal | 15 kcal/kg  

Est. Pro needs:  119 g Pro | 1.0 g Pro/kg 



PES STATEMENT: Inadequate oral intake (NI-2.1) related to loss of appetite as evidenced by 
pt interview | PO intake 50% x1meal



Inadequate protein intake (NI-5.6.1) related to increased protein needs as evidenced by 
presence of wound (shoulder)



INTERVENTION:  

Continue with current diet order of Regular diet. 

Pt may benefit from nutrition supplementation if PO intake declines.

Encouraged pt to eat when able. 

Will continue to follow and reassess as pt needs, intake, and status change. 



MONITOR/EVALUATE:  

PO Intake; Plan of Care; Hydration Status; Weight Status; Lab Values 





ALLEN Benjamin, MS, RD, LD

## 2020-08-26 NOTE — NUR
SPOKE WITH THE PT TO COMPLETE THE MED REC



PT DENIES TAKING ANY PRESCRIPTION MEDICATIONS



PT SAYS SHE DOES TAKE TYLENOL X-STRENGTH (500MG) AND WILL TAKE 4 TABS FOR EACH DOSE AND DOES 
THAT A COUPLE TIMES A DAY

## 2020-08-26 NOTE — NUR
CM/SS:  Visited with pt as to plan for discharge and that pt is requesting to leave AMA 
Against Medical Advise as she needs to care for her mother in law and has her celebration of 
life for her spouse today at 1pm.



Plan:  Pt to leave AMA Against Medical Advice  to get home and care for her Mother in law, 
and have the celebration of life for her spouse this afternoon at 1pm.



Pt is tearful and shares the story of her  and his illness and his death at [facility].  Pt is provided emotional support.  Today is also her spouse's birthday and that 
is why they have decided to have the celebration of life today.  



Pt is educated on picking up medication and taking it and feeling better.  Pt is educated on 
Leaving Against Medical Advice  and that normally we like pt's to leave with the ok from the 
physician.  Pt verbalizes understanding, and shares that she HAS to go, and she cannot stay. 
 Pt is encouraged to take her medication and  get to feeling better.

## 2020-08-26 NOTE — SHORT STAY SUMMARY-HOSPITALIST
History of Present Illness


HPI/Chief Complaint


Pt presented to the ER due to swelling and redness of arm. She was seen in the 

ER early this morning for the same thing and left AMA.  She returned this 

evening with worsening pain, swelling, and fever. She believes this started as a

spider bite. She was admitted for IV abx for treatment. This morning she states 

she feels well but needs to leave because there is no one to care for her mother

in law and today is supposed to be a celebration service for her late . 

She states she feels well but just can't stay right now.


Source:  patient


Date Seen


20


Time Seen by a Provider:  10:18


Attending Physician


Tracey Orellana MD


PCP


No,Local Physician


Referring Physician





Date of Admission


Aug 25, 2020 at 21:41





Home Medications & Allergies


Home Medications


Reviewed patient Home Medication Reconciliation performed by pharmacy medication

reconciliations technician and/or nursing.


Patients Allergies have been reviewed.





Allergies





Allergies


Coded Allergies


  No Known Drug Allergies (Verified09)








Past Medical-Social-Family Hx


Past Med/Social Hx:  Reviewed Nursing Past Med/Soc Hx


Patient Social History


Alcohol Use:  Denies Use


Recreational Drug Use:  No


Drug of Choice:  denies


Smoking Status:  Current Everyday Smoker


Type Used:  Cigarettes


2nd Hand Smoke Exposure:  No


Recent Foreign Travel:  No


Contact w/other who traveled:  No


Recent Hopitalizations:  No


Recent Infectious Disease Expo:  No





Immunizations Up To Date


Tetanus Booster (TDap):  Unknown





Seasonal Allergies


Seasonal Allergies:  No





Past Medical History


Cardiac:  High Cholesterol, Hypertension


Pregnant:  No


Reproductive:  No


Sexually Transmitted Disease:  No


Psychosocial:  Anxiety, Depression


Skin/Integumentary:  Recent Skin Changes


History of Blood Disorders:  No





Family History


Reviewed Nursing Family Hx





SOCIAL HISTORY: 


-HISTORY OF ETOH ABUSE/HEAVY USE--CLAIMS NONE X 12 YEARS, PER PT ON


20


-SMOKES METHAMPHETAMINES AND THC--DENIES IV USE


-SMOKES CIGARETTES 1 1/2 PPD





Review of Systems


Constitutional:  fever; No malaise, No weakness


EENTM:  no symptoms reported


Respiratory:  No cough, No dyspnea on exertion, No short of breath


Cardiovascular:  No chest pain, No edema, No palpitations


Gastrointestinal:  No abdominal pain, No constipation, No diarrhea, No nausea, 

No vomiting


Genitourinary:  no symptoms reported


Musculoskeletal:  No muscle pain, No muscle stiffness, No muscle weakness


Skin:  see HPI


Psychiatric/Neurological:  Denies Headache, Denies Weakness





Physical Exam


Physical Exam


Vital Signs





Vital Signs - First Documented








 20





 19:18


 


Temp 38.1


 


Pulse 120


 


Resp 18


 


B/P (MAP) 148/84 (105)


 


Pulse Ox 98


 


O2 Delivery Room Air





Capillary Refill : Less Than 3 Seconds


Height, Weight, BMI


Height: '"


Weight: lbs. oz. kg; 43.00 BMI


Method:


General Appearance:  No Apparent Distress, Obese


HEENT:  PERRL/EOMI, Moist Mucous Membranes; No Scleral Icterus (L), No Scleral 

Icterus (R)


Neck:  Normal Inspection, Supple


Respiratory:  Lungs Clear, No Accessory Muscle Use, No Respiratory Distress


Cardiovascular:  Regular Rate, Rhythm, No Murmur


Rectal:  Deferred


Extremity:  Normal Inspection, No Pedal Edema; No Swelling


Neurologic/Psychiatric:  Alert, Oriented x3, Normal Mood/Affect


Skin:  Tattoos/Piercings, Other (large edematous and erythematous area own upper

left back with induration)





Results


Results/Procedures


Labs


Laboratory Tests


20 19:30








20 04:27








Patient resulted labs reviewed.


Imaging:  Reviewed Imaging Report


Imaging


                 [facility] [place], [place]





NAME:   JER RODRÍGUEZ ANTONIO


[facility] REC#:   [id number]


ACCOUNT#:   [id number]


PT STATUS:   DEP ER


:   1980


PHYSICIAN:   PATEL COVARRUBIAS DO


ADMIT DATE:   20/ER


                                  ***Signed***


Date of Exam:20





CT CHEST W








PROCEDURE: CT chest with contrast only.





TECHNIQUE: Multiple contiguous axial images were obtained through


the chest after administration of intravenous contrast. Auto


Exposure Controls were utilized during the CT exam to meet ALARA


standards for radiation dose reduction. 





INDICATION:  Abscess left upper back.





CORRELATION: None





FINDINGS: 


A few scattered nonpathologically enlarged mediastinal lymph


nodes are present. There is prominent bilateral axillary lymph


nodes left greater than right. Given changes of the left back, on


the left likely reactive.  Heart size normal with trace anterior


pericardial effusion. 





The lung fields are clear of infiltrate.   No significant pleural


effusion.  





Mild hepatic steatosis. Nodularity and prominence left adrenal


gland, nonspecific.





There is rather extensive inflammatory density incompletely


visualized over the subcutaneous left periscapular subcutaneous


soft tissues. A definitive encapsulated drainable abscess does


not appear to be suggested. No abnormal gas collection.








IMPRESSION: 


Rather extensive subcutaneous inflammatory changes about the left


periscapular subcutaneous soft tissues. A definitive encapsulated


drainable abscess is not demonstrated. Likely reactive left


axillary lymphadenopathy.








A preliminary report was provided by An[facility].











Dictated by: 





  Dictated on workstation # [id number]








Dict:   [id number]


Trans:   [id number]


NATO [id number]





Interpreted by:     CAILIN CHANG DO


Electronically signed by: CAILIN CHANG DO [id number]





Short Stay Diagnosis


Discharge Diagnosis-Short Stay


Admission Diagnosis


Sepsis


Final Discharge Diagnosis


Sepsis





Conclusion


Plan


Sepsis


Cellulitis


   Started on Vanc and Zosyn


   Blood cultures pending


   Met criteria for sepsis due to leukocytosis, fever, and tachycardia


   patient is requesting to leave AMA


   Discussed risks of worsening infection with her if not treated and my medical

opinion that IV antibiotics are needed to treat her infection


   She expressed understanding of risks and still would like to leave AMA, 

agrees to sign AMA paperwork


   Rx sent for abx to attempt treatment


   Advised to return for any worsening symptoms





Clinical Quality Measures


DVT/VTE Risk/Contraindication:


Risk Factor Score Per Nursin


RFS Level Per Nursing on Admit:  4+=Very High











TAMMY BOBO MD              Aug 26, 2020 10:18

## 2020-08-29 ENCOUNTER — HOSPITAL ENCOUNTER (INPATIENT)
Dept: HOSPITAL 75 - ER | Age: 40
LOS: 3 days | Discharge: HOME | DRG: 854 | End: 2020-09-01
Attending: SURGERY | Admitting: SURGERY
Payer: SELF-PAY

## 2020-08-29 VITALS — HEIGHT: 64.96 IN | WEIGHT: 255.74 LBS | BODY MASS INDEX: 42.61 KG/M2

## 2020-08-29 VITALS — DIASTOLIC BLOOD PRESSURE: 67 MMHG | SYSTOLIC BLOOD PRESSURE: 106 MMHG

## 2020-08-29 DIAGNOSIS — F10.11: ICD-10-CM

## 2020-08-29 DIAGNOSIS — I96: ICD-10-CM

## 2020-08-29 DIAGNOSIS — E78.00: ICD-10-CM

## 2020-08-29 DIAGNOSIS — L03.312: ICD-10-CM

## 2020-08-29 DIAGNOSIS — A41.9: Primary | ICD-10-CM

## 2020-08-29 DIAGNOSIS — F17.210: ICD-10-CM

## 2020-08-29 DIAGNOSIS — E66.01: ICD-10-CM

## 2020-08-29 DIAGNOSIS — J44.9: ICD-10-CM

## 2020-08-29 DIAGNOSIS — I10: ICD-10-CM
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
## 2020-08-29 DIAGNOSIS — F32.9: ICD-10-CM

## 2020-08-29 DIAGNOSIS — F15.10: ICD-10-CM

## 2020-08-29 DIAGNOSIS — F41.9: ICD-10-CM

## 2020-08-29 DIAGNOSIS — D64.9: ICD-10-CM

## 2020-08-29 DIAGNOSIS — L02.212: ICD-10-CM

## 2020-08-29 LAB
%HYPO/RBC NFR BLD AUTO: SLIGHT %
ALBUMIN SERPL-MCNC: 3.4 GM/DL (ref 3.2–4.5)
ALP SERPL-CCNC: 150 U/L (ref 40–136)
ALT SERPL-CCNC: 33 U/L (ref 0–55)
ANISOCYTOSIS BLD QL SMEAR: SLIGHT
APTT BLD: 33 SEC (ref 24–35)
BASOPHILS # BLD AUTO: 0 10^3/UL (ref 0–0.1)
BASOPHILS NFR BLD AUTO: 0 % (ref 0–10)
BASOPHILS NFR BLD MANUAL: 0 %
BILIRUB SERPL-MCNC: 0.4 MG/DL (ref 0.1–1)
BUN/CREAT SERPL: 6
CALCIUM SERPL-MCNC: 8.9 MG/DL (ref 8.5–10.1)
CHLORIDE SERPL-SCNC: 94 MMOL/L (ref 98–107)
CO2 SERPL-SCNC: 23 MMOL/L (ref 21–32)
CREAT SERPL-MCNC: 0.78 MG/DL (ref 0.6–1.3)
EOSINOPHIL # BLD AUTO: 0.2 10^3/UL (ref 0–0.3)
EOSINOPHIL NFR BLD AUTO: 1 % (ref 0–10)
EOSINOPHIL NFR BLD MANUAL: 0 %
ERYTHROCYTE [DISTWIDTH] IN BLOOD BY AUTOMATED COUNT: 15.9 % (ref 10–14.5)
GFR SERPLBLD BASED ON 1.73 SQ M-ARVRAT: > 60 ML/MIN
GLUCOSE SERPL-MCNC: 118 MG/DL (ref 70–105)
HCT VFR BLD CALC: 32 % (ref 35–52)
HGB BLD-MCNC: 10.6 G/DL (ref 11.5–16)
INR PPP: 1.1 (ref 0.8–1.4)
LYMPHOCYTES # BLD AUTO: 2.4 X 10^3 (ref 1–4)
LYMPHOCYTES NFR BLD AUTO: 10 % (ref 12–44)
MANUAL DIFFERENTIAL PERFORMED BLD QL: YES
MCH RBC QN AUTO: 26 PG (ref 25–34)
MCHC RBC AUTO-ENTMCNC: 33 G/DL (ref 32–36)
MCV RBC AUTO: 78 FL (ref 80–99)
MONOCYTES # BLD AUTO: 1.7 X 10^3 (ref 0–1)
MONOCYTES NFR BLD AUTO: 8 % (ref 0–12)
MONOCYTES NFR BLD: 4 %
NEUTROPHILS # BLD AUTO: 18.5 X 10^3 (ref 1.8–7.8)
NEUTROPHILS NFR BLD AUTO: 81 % (ref 42–75)
NEUTS BAND NFR BLD MANUAL: 76 %
NEUTS BAND NFR BLD: 6 %
PLATELET # BLD: 650 10^3/UL (ref 130–400)
PMV BLD AUTO: 9.2 FL (ref 7.4–10.4)
POLYCHROMASIA BLD QL SMEAR: SLIGHT
POTASSIUM SERPL-SCNC: 3.8 MMOL/L (ref 3.6–5)
PROT SERPL-MCNC: 7.4 GM/DL (ref 6.4–8.2)
PROTHROMBIN TIME: 14.7 SEC (ref 12.2–14.7)
SODIUM SERPL-SCNC: 133 MMOL/L (ref 135–145)
VARIANT LYMPHS NFR BLD MANUAL: 14 %
WBC # BLD AUTO: 22.8 10^3/UL (ref 4.3–11)

## 2020-08-29 PROCEDURE — 87205 SMEAR GRAM STAIN: CPT

## 2020-08-29 PROCEDURE — 80048 BASIC METABOLIC PNL TOTAL CA: CPT

## 2020-08-29 PROCEDURE — 85610 PROTHROMBIN TIME: CPT

## 2020-08-29 PROCEDURE — 85730 THROMBOPLASTIN TIME PARTIAL: CPT

## 2020-08-29 PROCEDURE — 36415 COLL VENOUS BLD VENIPUNCTURE: CPT

## 2020-08-29 PROCEDURE — 85027 COMPLETE CBC AUTOMATED: CPT

## 2020-08-29 PROCEDURE — 87075 CULTR BACTERIA EXCEPT BLOOD: CPT

## 2020-08-29 PROCEDURE — 83605 ASSAY OF LACTIC ACID: CPT

## 2020-08-29 PROCEDURE — 80053 COMPREHEN METABOLIC PANEL: CPT

## 2020-08-29 PROCEDURE — 71260 CT THORAX DX C+: CPT

## 2020-08-29 PROCEDURE — 87186 SC STD MICRODIL/AGAR DIL: CPT

## 2020-08-29 PROCEDURE — 87040 BLOOD CULTURE FOR BACTERIA: CPT

## 2020-08-29 PROCEDURE — 85025 COMPLETE CBC W/AUTO DIFF WBC: CPT

## 2020-08-29 PROCEDURE — 83540 ASSAY OF IRON: CPT

## 2020-08-29 PROCEDURE — 82728 ASSAY OF FERRITIN: CPT

## 2020-08-29 PROCEDURE — 87081 CULTURE SCREEN ONLY: CPT

## 2020-08-29 PROCEDURE — 87070 CULTURE OTHR SPECIMN AEROBIC: CPT

## 2020-08-29 PROCEDURE — 84703 CHORIONIC GONADOTROPIN ASSAY: CPT

## 2020-08-29 PROCEDURE — 85007 BL SMEAR W/DIFF WBC COUNT: CPT

## 2020-08-29 PROCEDURE — 80202 ASSAY OF VANCOMYCIN: CPT

## 2020-08-29 PROCEDURE — 87077 CULTURE AEROBIC IDENTIFY: CPT

## 2020-08-29 RX ADMIN — SODIUM CHLORIDE SCH MLS/HR: 900 INJECTION, SOLUTION INTRAVENOUS at 22:48

## 2020-08-29 NOTE — NUR
JER RODRÍGUEZ admitted to room 425-1, with an admitting diagnosis of SEPSIS AND LEFT 
SHOULDER ABSCESS, on 08/29/20 from [facility], [place], operated by [facility] via WHEELCHAIR, accompanied by 
STAFF.JER RODRÍGUEZ introduced to surroundings, call light, bed controls, phone, TV, 
temperature control, lights, meal times, smoking policy, visitor policy, side rail policy, 
bathrooms and showers.  Patient Rights given to patient in the handbook. JER RODRÍGUEZ 
verbalizes understanding that [facility] is not responsible for the loss or damage to any 
personal effects or valuables that are kept in the patients possession during their 
hospitalization.

## 2020-08-29 NOTE — HISTORY AND PHYSICAL
DATE OF SERVICE:  



HISTORY OF PRESENT ILLNESS:

The patient is a 39-year-old female who presented to the Emergency Department

with worsening pain, swelling and erythema of the left shoulder.  She was

initially admitted on 08/25/2020, for what she felt was a spider bite with

surrounding redness, erythema as well as pain.  She was placed on IV

antibiotics; however, left against medical advice.  She returns today with

worsening pain, swelling as well as fluctuance along the left shoulder as well

as an area of skin necrosis, likely consistent with a recluse spider bite as

well as an underlying abscess.  She reports that she did have some fevers at

home; however, has been afebrile since being evaluated.



PAST MEDICAL HISTORY:

Hypertension, hypercholesterolemia.



PAST SURGICAL HISTORY:

Tubal ligation.



ALLERGIES:

No known drug allergies.



MEDICATIONS:

Cephalexin, Bactrim.



SOCIAL HISTORY:

Positive smoker.  Previous alcohol abuse; however, states that she has quit 10

years ago.



FAMILY HISTORY:

Noncontributory.



REVIEW OF SYSTEMS:

Well-nourished female, currently guarded secondary to the shoulder pain.  She is

not experiencing any shortness of breath or difficulty in breathing.  No chest

pain, palpitations, diaphoresis.  No nausea, vomiting, no diarrhea or

constipation.  Fevers at home.  No chills.  No recent inadvertent weight loss. 

All other review of systems negative.



PHYSICAL EXAMINATION:

VITAL SIGNS:  Temperature 36.9, blood pressure 110/87, pulse 120, respirations

22, pulse ox 97% on room air.

CHEST:  Clear, good breath sounds bilaterally.

HEART:  Regular, no murmurs.

EXTREMITIES:  No lower extremity edema, negative Homans sign.

HEENT:  No scleral icterus.

NECK:  No cervical lymphadenopathy.

ABDOMEN:  Soft, nontender, nondistended.

SKIN:  There is redness, swelling as well as an area of fluctuance with

overlying eschar, which may be consistent with a recluse spider bite and an

underlying abscess.



LABORATORY DATA:

WBC 22.3, hemoglobin 10.6, hematocrit 32, platelets 650.  BUN 5, creatinine

0.73.



ASSESSMENT AND PLAN:

A 39-year-old female with cellulitis, abscess as well as skin necrosis over the

left shoulder.  She will be admitted and started on IV antibiotics including

vancomycin and Zosyn and we will also proceed with an incision and drainage of

the abscess as well as debridement of any devitalized tissue.





Job ID: [id number]

DocumentID: [id number]

Dictated Date:  08/29/2020 23:27:05

Transcription Date: 08/29/2020 23:41:58

Dictated By: BERNY REDDING MD

## 2020-08-29 NOTE — PROGRESS NOTE-PRE OPERATIVE
Pre-Operative Progress Note


H&P Reviewed


The H&P was reviewed, patient examined and no changes noted.


Date Seen by Provider:  Aug 29, 2020


Time Seen by Provider:  23:00


Date H&P Reviewed:  Aug 29, 2020


Time H&P Reviewed:  23:00


Pre-Operative Diagnosis:  left shoulder abscess











BERNY REDDING MD                Aug 29, 2020 23:10

## 2020-08-29 NOTE — ED GENERAL
General


Chief Complaint:  Skin/Wound Problems


Stated Complaint:  SPIDER BITE INFECTION


Nursing Triage Note:  


PT BROUGHT TO ER ROOM 6 VIA WHEELCHAIR WITH COMPLAINT OF ABSCESS TO THE LEFT 


UPPER BACK. PATIENT STATES SHE WAS ADMITTED TO [facility] ON 8/25/20 AND LEFT 


AMA ON 8/26/20 BECAUSE SHE WANTED TO ATTEND HER HUSBANDS CELEBRATION OF LIFE. 


PATIENT STATES SHE WAS SENT HOME ON ANTIBIOTICS CEPHALEXIN AND BACTRIM BUT SHE 


CONTINUES TO GET WORSE. PATIENT HAS A LARGE DARK RED AREA ON LEFT UPPER BACK 


WITH SWELLING PRESENT.


Nursing Sepsis Screen:  Possible Severe Sepsis Risk


Source of Information:  Patient, Old Records


Exam Limitations:  No Limitations





History of Present Illness


Date Seen by Provider:  Aug 29, 2020


Time Seen by Provider:  21:10


Initial Comments


This 39-year-old woman presents to the emergency room with complaints of "spider

bite", sepsis, and cellulitis over the left scapula for which she was admitted 

on August 25.  She left on August 26 AGAINST MEDICAL ADVICE so that she could 

attend her late 's celebration of life ceremony.  She returns today with 

continued fevers and worsening pain and swelling of the affected area.  She has 

been on oral antibiotics and has worsened despite that.  Antibiotics included 

Bactrim and Keflex.  Patient reports pain with movement and inspiration.





Allergies and Home Medications


Allergies


Coded Allergies:  


     No Known Drug Allergies (Verified , 2/19/09)





Home Medications


Acetaminophen 500 Mg Tablet, 2,000 MG PO BID PRN for PAIN-MILD (1-4), (Reported)


   TAKES 4 (500MG) TABS 


Cephalexin 500 Mg Capsule, 500 MG PO BID, (Reported)


   FILLED 08- #10/5 DAY SUPPLY 


Sulfamethoxazole/Trimethoprim 1 Each Tablet, 1 EA PO BID, (Reported)


   FILLED 08- #10/5 DAY SUPPLY 





Patient Home Medication List


Home Medication List Reviewed:  Yes





Review of Systems


Review of Systems


Constitutional:  see HPI


EENTM:  no symptoms reported


Respiratory:  no symptoms reported


Cardiovascular:  no symptoms reported


Gastrointestinal:  no symptoms reported


Genitourinary:  no symptoms reported


Pregnant:  No


Musculoskeletal:  see HPI


Skin:  see HPI


Psychiatric/Neurological:  No Symptoms Reported


Hematologic/Lymphatic:  No Symptoms Reported


Immunological/Allergic:  no symptoms reported





Past Medical-Social-Family Hx


Past Med/Social Hx:  Reviewed Nursing Past Med/Soc Hx


Patient Social History


Alcohol Use:  Denies Use


Recreational Drug Use:  No


Drug of Choice:  denies


Smoking Status:  Current Everyday Smoker


Type Used:  Cigarettes


2nd Hand Smoke Exposure:  No


Recent Foreign Travel:  No


Contact w/Someone Who Travel:  No


Recent Infectious Disease Expo:  No


Recent Hopitalizations:  No


Physical Abuse:  No


Sexual Abuse:  No


Mistreated:  No


Fear:  No





Immunizations Up To Date


Tetanus Booster (TDap):  Unknown





Seasonal Allergies


Seasonal Allergies:  No





Past Medical History


Surgeries:  No


Tubal Ligation


Respiratory:  Yes


COPD


Cardiac:  Yes


High Cholesterol, Hypertension


Neurological:  No


Pregnant:  No


Last Menstrual Period:  Aug 18, 2020


Reproductive Disorders:  No


GYN History:  Tubal Ligation


Sexually Transmitted Disease:  No


Genitourinary:  No


Gastrointestinal:  No


Musculoskeletal:  No


Endocrine:  No


HEENT:  No


Cancer:  No


Psychosocial:  Yes


Anxiety, Depression


Integumentary:  Yes


Recent Skin Changes


Blood Disorders:  No





Family Medical History


Reviewed Nursing Family Hx


No Pertinent Family Hx





SOCIAL HISTORY: 


-HISTORY OF ETOH ABUSE/HEAVY USE--CLAIMS NONE X 12 YEARS, PER PT ON


08/24/20


-SMOKES METHAMPHETAMINES AND THC--DENIES IV USE


-SMOKES CIGARETTES 1 1/2 PPD





Physical Exam-Suspected Sepsis


Physical Exam


Vital Signs





Vital Signs - First Documented








 8/29/20





 21:09


 


Temp 36.9


 


Pulse 120


 


Resp 22


 


B/P (MAP) 110/87 (95)


 


Pulse Ox 97


 


O2 Delivery Room Air





Capillary Refill : Less Than 3 Seconds








Blood Pressure Mean:                    95








Height, Weight, BMI


Height: '"


Weight: lbs. oz. kg; 41.00 BMI


Method:


General Appearance:  WD/WN, Moderate Distress


HEENT:  PERRL/EOMI, Normal ENT Inspection


Neck:  Normal Inspection


Respiratory:  Lungs Clear, Normal Breath Sounds, No Accessory Muscle Use, No 

Respiratory Distress


Cardiovascular:  No Edema, No Murmur, Tachycardia


Gastrointestinal:  Normal Bowel Sounds, Non Tender, Soft


Back:  Other (there is a very large area of swelling, edema, ecchymosis, heat, 

and tenderness over the left scapula.  There is a central eschar lesion in this 

area as well.  No drainage is noted.  Affected skin does elma.)


Extremity:  Normal Inspection, No Pedal Edema


Neurologic/Psychiatric:  Alert, Oriented x3, No Motor/Sensory Deficits, Normal 

Mood/Affect, CNs II-XII Norm as Tested


Skin:  other (see back exam above)





Focused Exam


Lactate Level


8/29/20 21:21: Lactic Acid Level 1.63





Lactic Acid Level








Progress/Results/Core Measures


Suspected Sepsis


Recent Fever Within 48 Hours:  Yes


Infection Criteria Present:  Documented Infection


New/Unexplained  Altered Menta:  No


Sepsis Screen:  Possible Severe Sepsis Risk


SIRS


Temperature: 


Pulse: 120 


Respiratory Rate: 22


 


Laboratory Tests


8/29/20 21:21: White Blood Count 22.8H


8/31/20 05:25: White Blood Count 22.8H


Blood Pressure 110 /87 


Mean: 95


 


8/29/20 21:21: Lactic Acid Level 1.63


Laboratory Tests


8/29/20 21:21: 


Creatinine 0.78, INR Comment 1.1, Platelet Count 650H, Total Bilirubin 0.4


8/31/20 05:25: 


Creatinine 0.60, Platelet Count 472H








Results/Orders


Lab Results





Laboratory Tests








Test


 8/29/20


21:21 8/31/20


05:25 8/31/20


11:15 Range/Units


 


 


White Blood Count


 22.8 H


 22.8 H


 


 4.3-11.0


10^3/uL


 


Red Blood Count


 4.08 L


 2.80 L


 


 4.35-5.85


10^6/uL


 


Hemoglobin 10.6 L 7.2 #L  11.5-16.0  G/DL


 


Hematocrit 32 L 23 L  35-52  %


 


Mean Corpuscular Volume 78 L 81   80-99  FL


 


Mean Corpuscular Hemoglobin 26  26   25-34  PG


 


Mean Corpuscular Hemoglobin


Concent 33 


 32 


 


 32-36  G/DL





 


Red Cell Distribution Width 15.9 H 16.4 H  10.0-14.5  %


 


Platelet Count


 650 H


 472 H


 


 130-400


10^3/uL


 


Mean Platelet Volume 9.2  9.3   7.4-10.4  FL


 


Neutrophils (%) (Auto) 81 H   42-75  %


 


Lymphocytes (%) (Auto) 10 L   12-44  %


 


Monocytes (%) (Auto) 8    0-12  %


 


Eosinophils (%) (Auto) 1    0-10  %


 


Basophils (%) (Auto) 0    0-10  %


 


Neutrophils # (Auto) 18.5 H   1.8-7.8  X 10^3


 


Lymphocytes # (Auto) 2.4    1.0-4.0  X 10^3


 


Monocytes # (Auto) 1.7 H   0.0-1.0  X 10^3


 


Eosinophils # (Auto)


 0.2 


 


 


 0.0-0.3


10^3/uL


 


Basophils # (Auto)


 0.0 


 


 


 0.0-0.1


10^3/uL


 


Neutrophils % (Manual) 76     %


 


Lymphocytes % (Manual) 14     %


 


Monocytes % (Manual) 4     %


 


Eosinophils % (Manual) 0     %


 


Basophils % (Manual) 0     %


 


Band Neutrophils 6     %


 


Polychromasia SLIGHT     


 


Hypochromasia SLIGHT     


 


Anisocytosis SLIGHT     


 


Prothrombin Time 14.7    12.2-14.7  SEC


 


INR Comment 1.1    0.8-1.4  


 


Activated Partial


Thromboplast Time 33 


 


 


 24-35  SEC





 


Sodium Level 133 L 139   135-145  MMOL/L


 


Potassium Level 3.8  4.4   3.6-5.0  MMOL/L


 


Chloride Level 94 L 103     MMOL/L


 


Carbon Dioxide Level 23  26   21-32  MMOL/L


 


Anion Gap 16 H 10   5-14  MMOL/L


 


Blood Urea Nitrogen 5 L 10   7-18  MG/DL


 


Creatinine


 0.78 


 0.60 


 


 0.60-1.30


MG/DL


 


Estimat Glomerular Filtration


Rate > 60 


 > 60 


 


  





 


BUN/Creatinine Ratio 6  17    


 


Glucose Level 118 H 145 H    MG/DL


 


Lactic Acid Level


 1.63 


 


 


 0.50-2.00


MMOL/L


 


Calcium Level 8.9  8.3 L  8.5-10.1  MG/DL


 


Corrected Calcium 9.4    8.5-10.1  MG/DL


 


Total Bilirubin 0.4    0.1-1.0  MG/DL


 


Aspartate Amino Transf


(AST/SGOT) 18 


 


 


 5-34  U/L





 


Alanine Aminotransferase


(ALT/SGPT) 33 


 


 


 0-55  U/L





 


Alkaline Phosphatase 150 H     U/L


 


Total Protein 7.4    6.4-8.2  GM/DL


 


Albumin 3.4    3.2-4.5  GM/DL


 


Serum Pregnancy Test,


Qualitative NEGATIVE 


 


 


 NEGATIVE  





 


Vancomycin Level Trough


 


 


 7.4 L


 10.0-20.0


UG/ML








Micro Results





Microbiology


8/30/20 MRSA Screen - Final, Complete


          MRSA not isolated


8/29/20 Blood Culture - Preliminary, Resulted


          No growth


8/29/20 Blood Culture - Preliminary, Resulted


          No growth





My Orders





Orders - JULIA ZIMMERMAN MD


Cbc With Automated Diff (8/29/20 21:17)


Comprehensive Metabolic Panel (8/29/20 21:17)


Blood Culture (8/29/20 21:17)


Sputum Culture (8/29/20 21:17)


Protime With Inr (8/29/20 21:17)


Partial Thromboplastin Time (8/29/20 21:17)


Ed Iv/Invasive Line Start (8/29/20 21:17)


Ed Iv/Invasive Line Start (8/29/20 21:17)


Vital Signs Adult Sepsis Patie Q15M (8/29/20 21:17)


O2 (8/29/20 21:17)


Remove Rings In Anticipation O (8/29/20 21:17)


Lactic Acid Analyzer (8/29/20 21:17)


Fentanyl  Injection (Sublimaze Injection (8/29/20 21:30)


Ns Iv 1000 Ml (Sodium Chloride 0.9%) (8/29/20 21:17)


Hcg,Qualitative Serum (8/29/20 21:21)


Ct Chest W (8/29/20 21:25)


Manual Differential (8/29/20 21:21)


Piperacillin Sodium/Tazobactam (Zosyn Vi (8/29/20 22:00)


Vancomycin Injection (Vancomycin Injecti (8/29/20 22:00)


Iohexol Injection (Omnipaque 350 Mg/Ml 1 (8/29/20 22:15)


Received Contrast (Hold Metformin- Contr (8/29/20 22:15)


Sodium Chloride Flush (Catheter Flush Sy (8/29/20 22:15)


Ns (Ivpb) (Sodium Chloride 0.9% Ivpb Bag (8/29/20 22:15)


Acetaminophen  Tablet (Tylenol  Tablet) (8/29/20 23:15)





Medications Given in ED





Vital Signs/I&O











 8/31/20 8/31/20 8/31/20 8/31/20





 04:37 07:25 08:00 12:03


 


Temp 36.7 36.7  36.2


 


Pulse 82 73  80


 


Resp 20 18  19


 


B/P (MAP) 117/69 (85) 124/74 (91)  120/68 (85)


 


Pulse Ox 93 97 97 97


 


O2 Delivery Room Air Room Air Room Air Room Air





Capillary Refill : Less Than 3 Seconds








Blood Pressure Mean:                    95








Progress Note :  


   Time:  22:25


Progress Note


Septic workup is being pursued.  Fentanyl was given for pain.  She is receiving 

IV fluids, Zosyn, and vancomycin.  CT was obtained for comparison with prior and

report is pending.  Blood cultures and lactic acid were drawn prior to 

antibiotics.





Diagnostic Imaging





   Diagonstic Imaging:  CT


   Plain Films/CT/US/NM/MRI:  chest


Comments


CT of the chest was viewed by me and compared with prior.  Stat rad report 

reviewed.  There is a large collection over the left chest wall and scapular 

area likely representing progressive abscess formation.





Departure


Communication (Admissions)


Time/Spoke to Admitting Phy:  22:55


Dr. Singletary


Time/Spoke to Consulting Phy:  23:00


Dr. Walsh notified by message





Impression





   Primary Impression:  


   Sepsis


   Qualified Codes:  A41.9 - Sepsis, unspecified organism


   Additional Impression:  


   Abscess of left shoulder


Disposition:  09 ADMITTED AS INPATIENT


Condition:  Improved





Admissions


Decision to Admit Reason:  Admit from ER (General)


Decision to Admit/Date:  Aug 29, 2020


Time/Decision to Admit Time:  21:20





Departure-Patient Inst.


Referrals:  


NO,LOCAL PHYSICIAN (PCP/Family)


Primary Care Physician











JULIA ZIMMERMAN MD        Aug 29, 2020 22:26

## 2020-08-30 VITALS — DIASTOLIC BLOOD PRESSURE: 69 MMHG | SYSTOLIC BLOOD PRESSURE: 130 MMHG

## 2020-08-30 VITALS — SYSTOLIC BLOOD PRESSURE: 134 MMHG | DIASTOLIC BLOOD PRESSURE: 86 MMHG

## 2020-08-30 VITALS — SYSTOLIC BLOOD PRESSURE: 123 MMHG | DIASTOLIC BLOOD PRESSURE: 76 MMHG

## 2020-08-30 VITALS — DIASTOLIC BLOOD PRESSURE: 74 MMHG | SYSTOLIC BLOOD PRESSURE: 119 MMHG

## 2020-08-30 VITALS — SYSTOLIC BLOOD PRESSURE: 106 MMHG | DIASTOLIC BLOOD PRESSURE: 67 MMHG

## 2020-08-30 VITALS — DIASTOLIC BLOOD PRESSURE: 61 MMHG | SYSTOLIC BLOOD PRESSURE: 115 MMHG

## 2020-08-30 VITALS — SYSTOLIC BLOOD PRESSURE: 102 MMHG | DIASTOLIC BLOOD PRESSURE: 49 MMHG

## 2020-08-30 VITALS — SYSTOLIC BLOOD PRESSURE: 125 MMHG | DIASTOLIC BLOOD PRESSURE: 78 MMHG

## 2020-08-30 VITALS — DIASTOLIC BLOOD PRESSURE: 47 MMHG | SYSTOLIC BLOOD PRESSURE: 104 MMHG

## 2020-08-30 VITALS — SYSTOLIC BLOOD PRESSURE: 117 MMHG | DIASTOLIC BLOOD PRESSURE: 73 MMHG

## 2020-08-30 VITALS — DIASTOLIC BLOOD PRESSURE: 71 MMHG | SYSTOLIC BLOOD PRESSURE: 133 MMHG

## 2020-08-30 VITALS — DIASTOLIC BLOOD PRESSURE: 80 MMHG | SYSTOLIC BLOOD PRESSURE: 147 MMHG

## 2020-08-30 PROCEDURE — 0JB70ZZ EXCISION OF BACK SUBCUTANEOUS TISSUE AND FASCIA, OPEN APPROACH: ICD-10-PCS | Performed by: SURGERY

## 2020-08-30 RX ADMIN — SODIUM CHLORIDE, SODIUM LACTATE, POTASSIUM CHLORIDE, AND CALCIUM CHLORIDE SCH MLS/HR: 600; 310; 30; 20 INJECTION, SOLUTION INTRAVENOUS at 17:47

## 2020-08-30 RX ADMIN — VANCOMYCIN HYDROCHLORIDE SCH MLS/HR: 500 INJECTION, POWDER, LYOPHILIZED, FOR SOLUTION INTRAVENOUS at 13:29

## 2020-08-30 RX ADMIN — SODIUM CHLORIDE SCH MLS/HR: 900 INJECTION, SOLUTION INTRAVENOUS at 20:01

## 2020-08-30 RX ADMIN — SODIUM CHLORIDE SCH MLS/HR: 900 INJECTION, SOLUTION INTRAVENOUS at 04:06

## 2020-08-30 RX ADMIN — MORPHINE SULFATE PRN MG: 10 INJECTION, SOLUTION INTRAMUSCULAR; INTRAVENOUS at 04:15

## 2020-08-30 RX ADMIN — SODIUM CHLORIDE, SODIUM LACTATE, POTASSIUM CHLORIDE, AND CALCIUM CHLORIDE SCH MLS/HR: 600; 310; 30; 20 INJECTION, SOLUTION INTRAVENOUS at 09:55

## 2020-08-30 RX ADMIN — SODIUM CHLORIDE, SODIUM LACTATE, POTASSIUM CHLORIDE, AND CALCIUM CHLORIDE SCH MLS/HR: 600; 310; 30; 20 INJECTION, SOLUTION INTRAVENOUS at 08:22

## 2020-08-30 RX ADMIN — SODIUM CHLORIDE SCH MLS/HR: 900 INJECTION, SOLUTION INTRAVENOUS at 13:31

## 2020-08-30 RX ADMIN — SODIUM CHLORIDE, SODIUM LACTATE, POTASSIUM CHLORIDE, AND CALCIUM CHLORIDE SCH MLS/HR: 600; 310; 30; 20 INJECTION, SOLUTION INTRAVENOUS at 00:54

## 2020-08-30 RX ADMIN — SODIUM CHLORIDE SCH MLS/HR: 900 INJECTION, SOLUTION INTRAVENOUS at 01:25

## 2020-08-30 RX ADMIN — MORPHINE SULFATE PRN MG: 10 INJECTION, SOLUTION INTRAMUSCULAR; INTRAVENOUS at 08:25

## 2020-08-30 NOTE — NUR
RETURNED PER BED FROM [facility]. ALERT AND ORIENTED. O2 ON @ 3L N/C TKS >90%. DRSG ON LEFT 
SHOULDER WITH SHADOWING. DENIES PAIN. REQUESTING FOOD AND DRINK. DR. BOBO HERE AND REG DIET 
ORDERED.

## 2020-08-30 NOTE — DIAGNOSTIC IMAGING REPORT
PROCEDURE: CT chest with contrast only.



TECHNIQUE: Multiple contiguous axial images were obtained through

the chest after administration of intravenous contrast. Auto

Exposure Controls were utilized during the CT exam to meet ALARA

standards for radiation dose reduction. 



INDICATION:  Abscess over left upper back.



CORRELATION STUDY: 

8/24/2020



FINDINGS:  

Heart size within normal limits. Small anterior pericardial

effusion does appear to be slightly more prominent from prior.

There is some stranding and haziness with soft tissue nodularity

in the anterior superior mediastinum. No definitive

pathologically enlarged mediastinal lymph nodes with a few shotty

lymph nodes present.



In the lung fields, there has been development of several strandy

patchy pulmonary parenchymal infiltrates in the right upper and

lower lobes. No significant pleural effusion.



There is rather extensive increased density in the subcutaneous

tissues through a large portion of the left posterior lateral

subcutaneous chest wall. This area measures roughly approximately

20 x 7.5 x 15 cm. This area overall appears increased and more

dense and consolidated from prior. A definitive more focal

encapsulated fluid collection, however, is not suggested. There

does appear to be now some extension and involvement of the

posterior thoracic musculature.



IMPRESSION:

1. Rather extensive subcutaneous inflammatory change about the

left posterior back. This area overall appears increased from

prior study. A definitive  encapsulated drainable abscess not

demonstrated but does suggest likely infectious or inflammatory

process. There is also now extension to involve the posterior

thoracic musculature.

2. There has been development of some linear parenchymal

stranding with infiltrate of the right upper and lower lobes may

be reflective of pneumonia.

3. Some increased density anterior mediastinum. While could

reflect some thymic tissue, additional inflammatory change not

excluded. Small anterior pericardial effusion is also slightly

more prominent from prior.



Initial report was provided by [employer].







Dictated by: 



  Dictated on workstation # [id number]

## 2020-08-30 NOTE — CONSULTATION - HOSPITALIST
HPI


History of Present Illness:


HPI/Chief Complaint


Pt is a 39yoCF known to me from recent admission for cellulitis who returned due

to wrosening swelling and pain. She was admitted on  for this and elected to

leave AMA due to family obligations. She returned last night with continued 

fevers and increasing swelling and pain over her left shoulder. She reports she 

was compliant with her Bactrim and keflex. She was admitted to Dr Singletary for 

incision and drainage and I am consulted for medical management. I saw her 

immediately postop and she denies any pain.


Source:  patient


Exam Limitations:  no limitations


Date Seen


20


Attending Physician


Thomas Singletary MD


PCP


No,Local Physician


Referring Physician





Date of Admission


Aug 29, 2020 at 23:00





Home Medications & Allergies


Home Medications


Reviewed patient Home Medication Reconciliation performed by pharmacy medication

reconciliations technician and/or nursing.


Patients Allergies have been reviewed.





Allergies





Allergies


Coded Allergies


  No Known Drug Allergies ([id number])








Past Medical-Social-Family Hx


Past Med/Social Hx:  Reviewed Nursing Past Med/Soc Hx


Patient Social History


Marrital Status:  


Alcohol Use:  Denies Use


Recreational Drug Use:  No (denies but UDS positive for methamphetamine on )


Drug of Choice:  denies


Smoking Status:  Current Everyday Smoker


Type Used:  Cigarettes


2nd Hand Smoke Exposure:  No


Recent Foreign Travel:  No


Contact w/other who traveled:  No


Recent Hopitalizations:  No


Recent Infectious Disease Expo:  No





Immunizations Up To Date


Tetanus Booster (TDap):  Unknown





Seasonal Allergies


Seasonal Allergies:  No





Past Medical History


Surgeries:  Tubal Ligation


Currently Using CPAP:  No


Currently Using BIPAP:  No


Cardiac:  High Cholesterol, Hypertension


Pregnant:  No


Reproductive:  No


Sexually Transmitted Disease:  No


Tubal Ligation


Psychosocial:  Anxiety, Depression


Skin/Integumentary:  Recent Skin Changes


History of Blood Disorders:  No





Family History


Reviewed Nursing Family Hx


No Pertinent Family Hx





SOCIAL HISTORY: 


-HISTORY OF ETOH ABUSE/HEAVY USE--CLAIMS NONE X 12 YEARS, PER PT ON


20


-SMOKES METHAMPHETAMINES AND THC--DENIES IV USE


-SMOKES CIGARETTES 1 1/2 PPD





Review of Systems


Constitutional:  No chills; fever; No malaise


EENTM:  no symptoms reported


Respiratory:  No cough, No short of breath


Cardiovascular:  No chest pain, No edema, No palpitations


Gastrointestinal:  No abdominal pain, No constipation, No diarrhea, No nausea, 

No vomiting


Genitourinary:  no symptoms reported


Musculoskeletal:  see HPI


Skin:  see HPI


Psychiatric/Neurological:  No Symptoms Reported





Physical Exam


Physical Exam


Vital Signs





Vital Signs - First Documented








 20





 21:09


 


Temp 36.9


 


Pulse 120


 


Resp 22


 


B/P (MAP) 110/87 (95)


 


Pulse Ox 97


 


O2 Delivery Room Air





Capillary Refill : Less Than 3 SecondsLess Than 3 Seconds


Height, Weight, BMI


Height: '"


Weight: lbs. oz. kg; 42.60 BMI


Method:


General Appearance:  No Apparent Distress, Chronically ill, Obese


HEENT:  PERRL/EOMI, Moist Mucous Membranes; No Scleral Icterus (L), No Scleral 

Icterus (R)


Neck:  Normal Inspection, Supple


Respiratory:  Lungs Clear, No Accessory Muscle Use, No Respiratory Distress


Cardiovascular:  Regular Rate, Rhythm, No Edema, No Murmur


Gastrointestinal:  Normal Bowel Sounds, Non Tender, Soft


Extremity:  Normal Capillary Refill, Normal Inspection, No Pedal Edema


Neurologic/Psychiatric:  Alert, Oriented x3, Normal Mood/Affect; No Aphasia, No 

Facial Droop


Skin:  No Mottled; Other (left shoulder wound covered with surgical dressing, 

dressing clean dry and intact, had just return from the OR so I did not undress.

There is not erythema outside of the dressing)





Results


Results/Procedures


Labs


Laboratory Tests


20 21:21








Patient resulted labs reviewed.


Imaging:  Reviewed Imaging Report


Imaging


                 [facility] [place], [place]





NAME:   JER RODRÍGUEZ


[facility] REC#:   [id number]


ACCOUNT#:   [id number]


PT STATUS:   ADM IN


:   1980


PHYSICIAN:   JULIA ZIMMERMAN MD


ADMIT DATE:   20/4TH


                                  ***Signed***


Date of Exam:20





CT CHEST W








PROCEDURE: CT chest with contrast only.





TECHNIQUE: Multiple contiguous axial images were obtained through


the chest after administration of intravenous contrast. Auto


Exposure Controls were utilized during the CT exam to meet ALARA


standards for radiation dose reduction. 





INDICATION:  Abscess over left upper back.





CORRELATION STUDY: 


2020





FINDINGS:  


Heart size within normal limits. Small anterior pericardial


effusion does appear to be slightly more prominent from prior.


There is some stranding and haziness with soft tissue nodularity


in the anterior superior mediastinum. No definitive


pathologically enlarged mediastinal lymph nodes with a few shotty


lymph nodes present.





In the lung fields, there has been development of several strandy


patchy pulmonary parenchymal infiltrates in the right upper and


lower lobes. No significant pleural effusion.





There is rather extensive increased density in the subcutaneous


tissues through a large portion of the left posterior lateral


subcutaneous chest wall. This area measures roughly approximately


20 x 7.5 x 15 cm. This area overall appears increased and more


dense and consolidated from prior. A definitive more focal


encapsulated fluid collection, however, is not suggested. There


does appear to be now some extension and involvement of the


posterior thoracic musculature.





IMPRESSION:


1. Rather extensive subcutaneous inflammatory change about the


left posterior back. This area overall appears increased from


prior study. A definitive  encapsulated drainable abscess not


demonstrated but does suggest likely infectious or inflammatory


process. There is also now extension to involve the posterior


thoracic musculature.


2. There has been development of some linear parenchymal


stranding with infiltrate of the right upper and lower lobes may


be reflective of pneumonia.


3. Some increased density anterior mediastinum. While could


reflect some thymic tissue, additional inflammatory change not


excluded. Small anterior pericardial effusion is also slightly


more prominent from prior.





Initial report was provided by [employer].











Dictated by: 





  Dictated on workstation # [id number]








Dict:   20 [id number]


Trans:   20 1024


[facility] [id number]





Interpreted by:     CAILIN CHANG DO


Electronically signed by: CAILIN CHANG DO 20 1024





Assessment/Plan


Assessment and Plan


Assess & Plan/Chief Complaint


Sepsis


Cellulitis and abscess of left upper back


   Febrile, tachycardiac, and leukocytosis on arrival


   POD #0


   Continue pain regimen


   Vanc and Zosyn for IV abx


   Await operative cultures


   


DVT ppx: SCDs for now as just had surgery





Diagnosis/Problems


Diagnosis/Problems





(1) Sepsis


Status:  Acute


Qualifiers:  


   Sepsis type:  sepsis due to unspecified organism  Sepsis acute organ 

dysfunction status:  without acute organ dysfunction  Qualified Codes:  A41.9 - 

Sepsis, unspecified organism


(2) Abscess of left shoulder


Status:  Acute





Clinical Quality Measures


DVT/VTE Risk/Contraindication:


Risk Factor Score Per Nursin


RFS Level Per Nursing on Admit:  4+=Very High











TAMMY BOBO MD              Aug 30, 2020 13:03

## 2020-08-30 NOTE — PROGRESS NOTE-POST OPERATIVE
Post-Operative Progess Note


Surgeon (s)/Assistant (s)


Surgeon


BERNY REDDING MD


Assistant:  gerardo blanco APRN





Pre-Operative Diagnosis


left shoulder abscess





Post-Operative Diagnosis





same([id number]).





Procedure & Operative Findings


Date of Procedure


8/30/20


Procedure Performed/Findings


incision and drainage and debridement left shoulder and flank abscess.


Anesthesia Type


get





Estimated Blood Loss


Estimated blood loss (mL):  100ml





Specimens/Packing


Specimens Removed


none











BERNY REDDING MD                Aug 30, 2020 11:28

## 2020-08-30 NOTE — NUR
CR 0.78; CR CL > 60;  KG; VANCO 2000 MG IV GIVEN LAST NIGHT IN ER; CONTINUE WITH VANCO 
1500 MG IV Q12H; TROUGH AFTER 3RD DOSE

## 2020-08-30 NOTE — OPERATIVE REPORT
DATE OF SERVICE:  08/30/2020



PREOPERATIVE DIAGNOSIS:

Large abscess, left shoulder and flank.



POSTOPERATIVE DIAGNOSIS:

Large abscess, left shoulder and flank, 25 x 15 cm in dimension.



PROCEDURE:

Incision and drainage and debridement of a large abscess and devitalized skin

and subcutaneous tissue 25 x 15 cm in size.



SURGEON:

Berny Redding MD.



ASSISTANT:

RAYMOND Marquez.



ANESTHESIA:

General endotracheal.



ESTIMATED BLOOD LOSS:

Minimal.



FINDINGS:

Large abscess, left shoulder and flank encompassing with necrotic overlying skin

and subcutaneous tissue, which was debrided.



DISPOSITION:

The patient tolerated the procedure well.



INDICATIONS:

The patient is a 39-year-old female who initially presented to the Emergency

Department with pain and swelling in the left shoulder region.  She was unsure

of the cause; however, felt that there was a potential spider bite within the

region.  She was admitted and placed on IV antibiotics; however, left against

medical advice.  She presented yesterday back to the Emergency Department with

worsening redness, swelling as well as significant fluctuance along the left

back and shoulder with an overlying area of necrotic skin.  This was consistent

with a large abscess.



DESCRIPTION OF PROCEDURE:

The patient was brought to the operating room, laid supine on the table.  After

adequate IV pain and sedative medications and general endotracheal intubation,

the patient was placed in the right lateral decubitus position.  The back was

then prepped and draped in standard surgical fashion.



A 0.5% Marcaine with epinephrine was then used to anesthetize the overlying skin

to the abscess, which was a large dimension of 25 x 15 cm.  The skin was then

opened using a 10 blade.  The devitalized tissue was then sharply debrided using

a 10 blade.  A large abscess was identified and this was completely evacuated. 

There was also necrotic debris within the subcutaneous tissue, which was also

sharply debrided.  We then used a pulse irrigation system of 3000 mL of sterile

saline.  Good hemostasis was achieved with electrocautery and the wound was

packed with wet to dry with Kerlix gauze followed by 4 x 4's, followed by ABD

pad.



The patient tolerated the procedure well.  We will admit her back to the floor

and continue IV antibiotics and await culture and sensitivity and we will also

consult wound care for possible wound VAC due to the large nature of the abscess

cavity.





Job ID: [id number]

DocumentID: [id number]

Dictated Date:  08/30/2020 11:24:32

Transcription Date: 08/30/2020 18:39:50

Dictated By: BERNY REDDING MD

## 2020-08-31 VITALS — SYSTOLIC BLOOD PRESSURE: 120 MMHG | DIASTOLIC BLOOD PRESSURE: 68 MMHG

## 2020-08-31 VITALS — SYSTOLIC BLOOD PRESSURE: 132 MMHG | DIASTOLIC BLOOD PRESSURE: 67 MMHG

## 2020-08-31 VITALS — SYSTOLIC BLOOD PRESSURE: 124 MMHG | DIASTOLIC BLOOD PRESSURE: 74 MMHG

## 2020-08-31 VITALS — SYSTOLIC BLOOD PRESSURE: 129 MMHG | DIASTOLIC BLOOD PRESSURE: 83 MMHG

## 2020-08-31 VITALS — SYSTOLIC BLOOD PRESSURE: 117 MMHG | DIASTOLIC BLOOD PRESSURE: 69 MMHG

## 2020-08-31 VITALS — DIASTOLIC BLOOD PRESSURE: 89 MMHG | SYSTOLIC BLOOD PRESSURE: 144 MMHG

## 2020-08-31 LAB
BUN/CREAT SERPL: 17
CALCIUM SERPL-MCNC: 8.3 MG/DL (ref 8.5–10.1)
CHLORIDE SERPL-SCNC: 103 MMOL/L (ref 98–107)
CO2 SERPL-SCNC: 26 MMOL/L (ref 21–32)
CREAT SERPL-MCNC: 0.6 MG/DL (ref 0.6–1.3)
ERYTHROCYTE [DISTWIDTH] IN BLOOD BY AUTOMATED COUNT: 16.4 % (ref 10–14.5)
GFR SERPLBLD BASED ON 1.73 SQ M-ARVRAT: > 60 ML/MIN
GLUCOSE SERPL-MCNC: 145 MG/DL (ref 70–105)
HCT VFR BLD CALC: 23 % (ref 35–52)
HGB BLD-MCNC: 7.2 G/DL (ref 11.5–16)
MCH RBC QN AUTO: 26 PG (ref 25–34)
MCHC RBC AUTO-ENTMCNC: 32 G/DL (ref 32–36)
MCV RBC AUTO: 81 FL (ref 80–99)
PLATELET # BLD: 472 10^3/UL (ref 130–400)
PMV BLD AUTO: 9.3 FL (ref 7.4–10.4)
POTASSIUM SERPL-SCNC: 4.4 MMOL/L (ref 3.6–5)
SODIUM SERPL-SCNC: 139 MMOL/L (ref 135–145)
WBC # BLD AUTO: 22.8 10^3/UL (ref 4.3–11)

## 2020-08-31 RX ADMIN — MORPHINE SULFATE PRN MG: 10 INJECTION, SOLUTION INTRAMUSCULAR; INTRAVENOUS at 23:54

## 2020-08-31 RX ADMIN — SODIUM CHLORIDE SCH MLS/HR: 900 INJECTION, SOLUTION INTRAVENOUS at 14:09

## 2020-08-31 RX ADMIN — SODIUM CHLORIDE SCH MLS/HR: 900 INJECTION, SOLUTION INTRAVENOUS at 21:38

## 2020-08-31 RX ADMIN — VANCOMYCIN HYDROCHLORIDE SCH MLS/HR: 500 INJECTION, POWDER, LYOPHILIZED, FOR SOLUTION INTRAVENOUS at 11:46

## 2020-08-31 RX ADMIN — VANCOMYCIN HYDROCHLORIDE SCH MLS/HR: 500 INJECTION, POWDER, LYOPHILIZED, FOR SOLUTION INTRAVENOUS at 20:38

## 2020-08-31 RX ADMIN — MORPHINE SULFATE PRN MG: 10 INJECTION, SOLUTION INTRAMUSCULAR; INTRAVENOUS at 00:29

## 2020-08-31 RX ADMIN — MORPHINE SULFATE PRN MG: 10 INJECTION, SOLUTION INTRAMUSCULAR; INTRAVENOUS at 15:35

## 2020-08-31 RX ADMIN — SODIUM CHLORIDE, SODIUM LACTATE, POTASSIUM CHLORIDE, AND CALCIUM CHLORIDE SCH MLS/HR: 600; 310; 30; 20 INJECTION, SOLUTION INTRAVENOUS at 11:50

## 2020-08-31 RX ADMIN — SODIUM CHLORIDE SCH MLS/HR: 900 INJECTION, SOLUTION INTRAVENOUS at 04:01

## 2020-08-31 RX ADMIN — VANCOMYCIN HYDROCHLORIDE SCH MLS/HR: 500 INJECTION, POWDER, LYOPHILIZED, FOR SOLUTION INTRAVENOUS at 00:17

## 2020-08-31 NOTE — PROGRESS NOTE - HOSPITALIST
Subjective


HPI/CC On Admission


Date Seen by Provider:  Aug 31, 2020


Time Seen by Provider:  09:30


Pt is a 39yoCF known to me from recent admission for cellulitis who returned due

to wrosening swelling and pain. She was admitted on  for this and elected to

leave AMA due to family obligations. She returned last night with continued 

fevers and increasing swelling and pain over her left shoulder. She reports she 

was compliant with her Bactrim and keflex. She was admitted to Dr Singletary for 

incision and drainage and I am consulted for medical management. I saw her 

immediately postop and she denies any pain.


Subjective/Events-last exam


she reports feeling better today.  She is wanting to go home.  She has no co

mplaints or concerns.





Focused Exam


Lactate Level


20 21:21: Lactic Acid Level 1.63








Objective


Exam


Vital Signs





Vital Signs








  Date Time  Temp Pulse Resp B/P (MAP) Pulse Ox O2 Delivery O2 Flow Rate FiO2


 


20 12:03 36.2 80 19 120/68 (85) 97 Room Air  


 


20 12:45       3 





Capillary Refill : Less Than 3 SecondsLess Than 3 Seconds


General Appearance:  No Apparent Distress, Obese


Neck:  Normal Inspection, Supple


Respiratory:  Lungs Clear, Normal Breath Sounds, No Respiratory Distress


Cardiovascular:  Regular Rate, Rhythm, No Edema, No Murmur


Gastrointestinal:  Normal Bowel Sounds, Non Tender, Soft


Back:  Other (large left shoulder dressing in place)


Extremity:  Normal Inspection, Non Tender, No Pedal Edema


Neurologic/Psychiatric:  Alert, Oriented x3, No Motor/Sensory Deficits, Normal 

Mood/Affect


Skin:  Normal Color, Warm/Dry





Results/Procedures


Lab


Laboratory Tests


20 05:25








Patient resulted labs reviewed.


Imaging:  Reviewed Imaging Report





Assessment/Plan


Assessment and Plan


Assess & Plan/Chief Complaint


Sepsis


Cellulitis and abscess of left upper back


Vanc and Zosyn for IV abx


Await operative cultures





methamphetamine abuse


Recommend cessation





Morbid obesity


clinically significant, no acute management needs





DVT prophylaxis: SCDs





Diagnosis/Problems


Diagnosis/Problems





(1) Sepsis


Status:  Acute


Qualifiers:  


   Sepsis type:  sepsis due to unspecified organism  Sepsis acute organ 

dysfunction status:  without acute organ dysfunction  Qualified Codes:  A41.9 - 

Sepsis, unspecified organism


(2) Abscess of left shoulder


Status:  Acute


(3) Morbid obesity


Status:  Chronic





Clinical Quality Measures


DVT/VTE Risk/Contraindication:


Risk Factor Score Per Nursin


RFS Level Per Nursing on Admit:  4+=Very High











CATIE HARMON MD              Aug 31, 2020 13:27

## 2020-08-31 NOTE — NUR
VANCOMYCIN DOSING:

LABS:  SCr 0.60, VANCOMYCIN TROUGH LEVEL 7.4 8/31 @ 1100

PHARMACOKINETICS/CLINCALC (Kd = 0.118, t1/2 = 5.9 H)

PLAN:  VANCOMYCIN CHANGED TO 1500MG IV Q8H, RECHECK LEVEL 9/1 @ 1100.

## 2020-08-31 NOTE — NUR
SPOKE WITH THE PT AND WENT THRU THE EXT MED HISTORY TO COMPLETE THE MED REC



PT IS TAKING BACTRIM AND KEFLEX WAS HER PRIOR VISIT HERE



OTC MEDS:

TYLENOL

## 2020-08-31 NOTE — DISCHARGE INST-SURGICAL
D/C Lap Instructions-MILADIS


New, Converted, or Re-Newed RX:  RX on Chart


Follow Up Appt in 2 weeks





wet to day dressing change BID





Activity as tolerated


No driving for 24 hours


No driving while on pain medications








Regular Diet





Symptoms to Report: Fever over 101 degree F, Nausea/Vomiting 


Infection Signs and Symptoms to report:  Increased redness, Foul odor of wound, 

Increased drainage





Bathing instructions: May shower


Operative Area Clean/Dry;  Keep incision clean/dry





If any problems/questions: Contact your physician or go to Emergency Room











BERNY REDDING MD                Aug 31, 2020 16:58

## 2020-08-31 NOTE — ANESTHESIA-GENERAL POST-OP
General


Patient Condition


Mental Status/LOC:  Same as Preop


Cardiovascular:  Satisfactory


Nausea/Vomiting:  Absent


Respiratory:  Satisfactory


Pain:  Controlled


Complications:  Absent





Post Op Complications


Complications


None





Follow Up Care/Instructions


Patient Instructions


None needed.





Anesthesia/Patient Condition


Patient Condition


Patient is doing well, no complaints, stable vital signs, no apparent adverse 

anesthesia problems.   


No complications reported per nursing.











ELVIA BRYANT CRNA              Aug 31, 2020 08:32

## 2020-08-31 NOTE — PROGRESS NOTE
Subjective


Date Seen by a Provider:  Aug 31, 2020


Time Seen by a Provider:  15:20


Subjective/Events-last exam


Patient seen with Dr. Singletary.  Patient reports doing well and minimal pain.  

Denies any fever or chills.  Tolerating diet.  Patient reports that she would 

like to go home if possible.





Focused Exam


Lactate Level


20 21:21: Lactic Acid Level 1.63





Objective


Exam





Vital Signs








  Date Time  Temp Pulse Resp B/P (MAP) Pulse Ox O2 Delivery O2 Flow Rate FiO2


 


20 12:03 36.2 80 19 120/68 (85) 97 Room Air  


 


20 08:00     97 Room Air  


 


20 07:25 36.7 73 18 124/74 (91) 97 Room Air  


 


20 04:37 36.7 82 20 117/69 (85) 93 Room Air  


 


20 00:15 36.8 84 20 132/67 (88) 93 Room Air  


 


20 20:15     93 Room Air  


 


20 20:00 36.2 80 18 117/73 (88) 96 Room Air  


 


20 16:31 36.0 89 17 130/69 (89) 93 Room Air  














I & O 


 


 20





 07:00


 


Intake Total 4585 ml


 


Output Total 400 ml


 


Balance 4185 ml





Capillary Refill : Less Than 3 SecondsLess Than 3 Seconds


General Appearance:  No Apparent Distress, WD/WN, Obese


Neck:  Normal Inspection, Supple


Respiratory:  No Accessory Muscle Use, No Respiratory Distress


Cardiovascular:  Regular Rate, Rhythm, No Edema


Gastrointestinal:  normal bowel sounds, non tender, soft


Extremity:  Normal Inspection, Normal Range of Motion


Neurologic/Psychiatric:  Alert, Oriented x3


Skin:  Other (There is a open wound of the left posterior shoulder and back.  

The redness has improved but does have localized erythema.  Tender to palpation.

 No active drainage.)





Results


Lab


Laboratory Tests


20 05:25: 


White Blood Count 22.8H, Red Blood Count 2.80L, Hemoglobin 7.2#L, Hematocrit 23L

, Mean Corpuscular Volume 81, Mean Corpuscular Hemoglobin 26, Mean Corpuscular 

Hemoglobin Concent 32, Red Cell Distribution Width 16.4H, Platelet Count 472H, 

Mean Platelet Volume 9.3, Sodium Level 139, Potassium Level 4.4, Chloride Level 

103, Carbon Dioxide Level 26, Anion Gap 10, Blood Urea Nitrogen 10, Creatinine 

0.60, Estimat Glomerular Filtration Rate > 60, BUN/Creatinine Ratio 17, Glucose 

Level 145H, Calcium Level 8.3L


20 11:15: Vancomycin Level Trough 7.4L





Microbiology


20 MRSA Screen - Final, Complete


          MRSA not isolated


20 Blood Culture - Preliminary, Resulted


          No growth





Assessment/Plan


Assessment/Plan


Assess & Plan/Chief Complaint


A 39-year-old female with cellulitis, abscess as well as skin necrosis over the 

left shoulder who is S/P I&D and debridement


VSS


WBC 22


Continue medical management with IV abx, pain meds and wound care with dressing 

changes.





Clinical Quality Measures


DVT/VTE Risk/Contraindication:


Risk Factor Score Per Nursin


RFS Level Per Nursing on Admit:  4+=Very High











BREE JAFFE APRN          Aug 31, 2020 16:27

## 2020-09-01 VITALS — SYSTOLIC BLOOD PRESSURE: 124 MMHG | DIASTOLIC BLOOD PRESSURE: 88 MMHG

## 2020-09-01 VITALS — SYSTOLIC BLOOD PRESSURE: 142 MMHG | DIASTOLIC BLOOD PRESSURE: 88 MMHG

## 2020-09-01 VITALS — DIASTOLIC BLOOD PRESSURE: 97 MMHG | SYSTOLIC BLOOD PRESSURE: 159 MMHG

## 2020-09-01 VITALS — SYSTOLIC BLOOD PRESSURE: 159 MMHG | DIASTOLIC BLOOD PRESSURE: 97 MMHG

## 2020-09-01 LAB
BASOPHILS # BLD AUTO: 0 10^3/UL (ref 0–0.1)
BASOPHILS NFR BLD AUTO: 0 % (ref 0–10)
BUN/CREAT SERPL: 23
CALCIUM SERPL-MCNC: 8.7 MG/DL (ref 8.5–10.1)
CHLORIDE SERPL-SCNC: 105 MMOL/L (ref 98–107)
CO2 SERPL-SCNC: 26 MMOL/L (ref 21–32)
CREAT SERPL-MCNC: 0.6 MG/DL (ref 0.6–1.3)
EOSINOPHIL # BLD AUTO: 0.2 10^3/UL (ref 0–0.3)
EOSINOPHIL NFR BLD AUTO: 1 % (ref 0–10)
ERYTHROCYTE [DISTWIDTH] IN BLOOD BY AUTOMATED COUNT: 16.3 % (ref 10–14.5)
GFR SERPLBLD BASED ON 1.73 SQ M-ARVRAT: > 60 ML/MIN
GLUCOSE SERPL-MCNC: 93 MG/DL (ref 70–105)
HCT VFR BLD CALC: 22 % (ref 35–52)
HGB BLD-MCNC: 6.9 G/DL (ref 11.5–16)
LYMPHOCYTES # BLD AUTO: 4 X 10^3 (ref 1–4)
LYMPHOCYTES NFR BLD AUTO: 21 % (ref 12–44)
MANUAL DIFFERENTIAL PERFORMED BLD QL: NO
MCH RBC QN AUTO: 26 PG (ref 25–34)
MCHC RBC AUTO-ENTMCNC: 31 G/DL (ref 32–36)
MCV RBC AUTO: 83 FL (ref 80–99)
MONOCYTES # BLD AUTO: 0.9 X 10^3 (ref 0–1)
MONOCYTES NFR BLD AUTO: 5 % (ref 0–12)
NEUTROPHILS # BLD AUTO: 13.7 X 10^3 (ref 1.8–7.8)
NEUTROPHILS NFR BLD AUTO: 73 % (ref 42–75)
PLATELET # BLD: 554 10^3/UL (ref 130–400)
PMV BLD AUTO: 9.3 FL (ref 7.4–10.4)
POTASSIUM SERPL-SCNC: 4.1 MMOL/L (ref 3.6–5)
SODIUM SERPL-SCNC: 141 MMOL/L (ref 135–145)
WBC # BLD AUTO: 18.8 10^3/UL (ref 4.3–11)

## 2020-09-01 RX ADMIN — SODIUM CHLORIDE SCH MLS/HR: 900 INJECTION, SOLUTION INTRAVENOUS at 05:27

## 2020-09-01 RX ADMIN — SODIUM CHLORIDE, SODIUM LACTATE, POTASSIUM CHLORIDE, AND CALCIUM CHLORIDE SCH MLS/HR: 600; 310; 30; 20 INJECTION, SOLUTION INTRAVENOUS at 02:43

## 2020-09-01 RX ADMIN — VANCOMYCIN HYDROCHLORIDE SCH MLS/HR: 500 INJECTION, POWDER, LYOPHILIZED, FOR SOLUTION INTRAVENOUS at 03:58

## 2020-09-01 NOTE — PROGRESS NOTE - HOSPITALIST
Subjective


HPI/CC On Admission


Date Seen by Provider:  Sep 1, 2020


Time Seen by Provider:  10:00


Pt is a 39yoCF known to me from recent admission for cellulitis who returned due

to wrosening swelling and pain. She was admitted on  for this and elected to

leave AMA due to family obligations. She returned last night with continued 

fevers and increasing swelling and pain over her left shoulder. She reports she 

was compliant with her Bactrim and keflex. She was admitted to Dr Singletary for 

incision and drainage and I am consulted for medical management. I saw her 

immediately postop and she denies any pain.


Subjective/Events-last exam


she says that she is feeling well and wants to go home.  She denies any fevers 

or chills.  She denies any pain.  She denies any bleeding.  We discussed her 

hemoglobin level being low and she does not want to get a transfusion.  She is 

willing to have a repeat hemoglobin check as an outpatient prior to her follow-

up with Dr. Singletary and establish care with the [facility].





Focused Exam


Lactate Level


20 21:21: Lactic Acid Level 1.63








Objective


Exam


Vital Signs





Vital Signs








  Date Time  Temp Pulse Resp B/P (MAP) Pulse Ox O2 Delivery O2 Flow Rate FiO2


 


20 09:00     97 Room Air  


 


20 08:00 36.0 88 24 159/97 (117)    


 


20 12:45       3 





Capillary Refill : Less Than 3 SecondsLess Than 3 Seconds


General Appearance:  No Apparent Distress, Obese


Neck:  Normal Inspection, Supple


Respiratory:  Lungs Clear, Normal Breath Sounds, No Respiratory Distress


Cardiovascular:  Regular Rate, Rhythm, No Edema, No Murmur


Gastrointestinal:  Normal Bowel Sounds, Non Tender, Soft


Back:  Other (left shoulder dressing in place)


Extremity:  Normal Inspection, Non Tender, No Pedal Edema


Neurologic/Psychiatric:  Alert, Oriented x3, No Motor/Sensory Deficits, Normal 

Mood/Affect


Skin:  Normal Color, Warm/Dry





Results/Procedures


Lab


Laboratory Tests


20 05:37








Patient resulted labs reviewed.


Imaging:  Reviewed Imaging Report





Assessment/Plan


Assessment and Plan


Assess & Plan/Chief Complaint


Cellulitis and abscess of left upper back


Transition to Zyvox for presumptive MRSA, complete two week course





Anemia


hemoglobin 6.9 this morning, relatively stable


iron studies ordered


patient does not want transfusion


repeat H&H on Friday





Methamphetamine abuse


Recommend cessation





Morbid obesity


clinically significant, no acute management needs





DVT prophylaxis: SCDs





Sepsis, resolved





Diagnosis/Problems


Diagnosis/Problems





(1) Sepsis


Status:  Acute


Qualifiers:  


   Sepsis type:  sepsis due to unspecified organism  Sepsis acute organ 

dysfunction status:  without acute organ dysfunction  Qualified Codes:  A41.9 - 

Sepsis, unspecified organism


(2) Abscess of left shoulder


Status:  Acute


(3) Morbid obesity


Status:  Chronic





Clinical Quality Measures


DVT/VTE Risk/Contraindication:


Risk Factor Score Per Nursin


RFS Level Per Nursing on Admit:  4+=Very High











CATIE HARMON MD               Sep 1, 2020 12:45

## 2020-09-01 NOTE — NUR
DR REDDING NOTIFIED OF CRITICAL HGB OF 6.9 COMPARED TO THE PREVIOUS HGB OF 7.2 NO NEW ORDERS AT 
THIS TIME, WILL CONTINUE TO MONITOR

## 2023-03-27 ENCOUNTER — HOSPITAL ENCOUNTER (OUTPATIENT)
Dept: HOSPITAL 75 - RAD | Age: 43
End: 2023-03-27
Attending: NURSE PRACTITIONER
Payer: COMMERCIAL

## 2023-03-27 DIAGNOSIS — S60.041A: ICD-10-CM

## 2023-03-27 DIAGNOSIS — S63.284A: Primary | ICD-10-CM
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
## 2023-03-27 PROCEDURE — 73218 MRI UPPER EXTREMITY W/O DYE: CPT

## 2023-06-15 ENCOUNTER — HOSPITAL ENCOUNTER (EMERGENCY)
Dept: HOSPITAL 75 - ER | Age: 43
Discharge: HOME | End: 2023-06-15
Payer: COMMERCIAL

## 2023-06-15 VITALS — SYSTOLIC BLOOD PRESSURE: 182 MMHG | DIASTOLIC BLOOD PRESSURE: 121 MMHG

## 2023-06-15 VITALS — WEIGHT: 260.15 LBS | BODY MASS INDEX: 44.41 KG/M2 | HEIGHT: 64.02 IN

## 2023-06-15 DIAGNOSIS — E66.9: ICD-10-CM

## 2023-06-15 DIAGNOSIS — S61.211A: Primary | ICD-10-CM

## 2023-06-15 DIAGNOSIS — F17.210: ICD-10-CM
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
## 2023-06-15 DIAGNOSIS — I10: ICD-10-CM

## 2023-06-15 DIAGNOSIS — Z23: ICD-10-CM

## 2023-06-15 PROCEDURE — 29130 APPL FINGER SPLINT STATIC: CPT

## 2023-06-15 PROCEDURE — 90715 TDAP VACCINE 7 YRS/> IM: CPT
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
